# Patient Record
Sex: FEMALE | Race: ASIAN | NOT HISPANIC OR LATINO | Employment: FULL TIME | URBAN - METROPOLITAN AREA
[De-identification: names, ages, dates, MRNs, and addresses within clinical notes are randomized per-mention and may not be internally consistent; named-entity substitution may affect disease eponyms.]

---

## 2020-06-29 ENCOUNTER — OFFICE VISIT - HEALTHEAST (OUTPATIENT)
Dept: FAMILY MEDICINE | Facility: CLINIC | Age: 20
End: 2020-06-29

## 2020-06-29 DIAGNOSIS — M20.11 VALGUS DEFORMITY OF BOTH GREAT TOES: ICD-10-CM

## 2020-06-29 DIAGNOSIS — M20.12 VALGUS DEFORMITY OF BOTH GREAT TOES: ICD-10-CM

## 2020-06-29 DIAGNOSIS — L03.818 CELLULITIS OF OTHER SPECIFIED SITE: ICD-10-CM

## 2020-06-29 ASSESSMENT — MIFFLIN-ST. JEOR: SCORE: 1346.02

## 2020-07-01 LAB — BACTERIA SPEC CULT: ABNORMAL

## 2020-07-06 ENCOUNTER — OFFICE VISIT - HEALTHEAST (OUTPATIENT)
Dept: PODIATRY | Facility: CLINIC | Age: 20
End: 2020-07-06

## 2020-07-06 DIAGNOSIS — M20.10 HALLUX VALGUS, UNSPECIFIED LATERALITY: ICD-10-CM

## 2020-07-13 ENCOUNTER — COMMUNICATION - HEALTHEAST (OUTPATIENT)
Dept: PODIATRY | Facility: CLINIC | Age: 20
End: 2020-07-13

## 2020-07-14 ENCOUNTER — OFFICE VISIT - HEALTHEAST (OUTPATIENT)
Dept: PODIATRY | Facility: CLINIC | Age: 20
End: 2020-07-14

## 2020-07-14 ENCOUNTER — SURGERY - HEALTHEAST (OUTPATIENT)
Dept: PODIATRY | Facility: CLINIC | Age: 20
End: 2020-07-14

## 2020-07-14 DIAGNOSIS — M20.10 HALLUX ABDUCTOVALGUS WITH BUNIONS, UNSPECIFIED LATERALITY: ICD-10-CM

## 2020-07-14 DIAGNOSIS — M20.10 HALLUX VALGUS, UNSPECIFIED LATERALITY: ICD-10-CM

## 2020-07-14 DIAGNOSIS — M21.619 HALLUX ABDUCTOVALGUS WITH BUNIONS, UNSPECIFIED LATERALITY: ICD-10-CM

## 2020-07-15 ENCOUNTER — COMMUNICATION - HEALTHEAST (OUTPATIENT)
Dept: PODIATRY | Facility: CLINIC | Age: 20
End: 2020-07-15

## 2020-07-16 ENCOUNTER — AMBULATORY - HEALTHEAST (OUTPATIENT)
Dept: SURGERY | Facility: AMBULATORY SURGERY CENTER | Age: 20
End: 2020-07-16

## 2020-07-16 ENCOUNTER — AMBULATORY - HEALTHEAST (OUTPATIENT)
Dept: PODIATRY | Facility: CLINIC | Age: 20
End: 2020-07-16

## 2020-07-16 DIAGNOSIS — Z11.59 ENCOUNTER FOR SCREENING FOR OTHER VIRAL DISEASES: ICD-10-CM

## 2020-07-30 ENCOUNTER — AMBULATORY - HEALTHEAST (OUTPATIENT)
Dept: LAB | Facility: CLINIC | Age: 20
End: 2020-07-30

## 2020-07-30 ENCOUNTER — COMMUNICATION - HEALTHEAST (OUTPATIENT)
Dept: FAMILY MEDICINE | Facility: CLINIC | Age: 20
End: 2020-07-30

## 2020-07-30 ENCOUNTER — OFFICE VISIT - HEALTHEAST (OUTPATIENT)
Dept: FAMILY MEDICINE | Facility: CLINIC | Age: 20
End: 2020-07-30

## 2020-07-30 DIAGNOSIS — M21.611 BILATERAL BUNIONS: ICD-10-CM

## 2020-07-30 DIAGNOSIS — M21.612 BILATERAL BUNIONS: ICD-10-CM

## 2020-07-30 DIAGNOSIS — Z01.818 PRE-OP EXAM: ICD-10-CM

## 2020-07-30 LAB
ANION GAP SERPL CALCULATED.3IONS-SCNC: 9 MMOL/L (ref 5–18)
BUN SERPL-MCNC: 11 MG/DL (ref 8–22)
CALCIUM SERPL-MCNC: 9 MG/DL (ref 8.5–10.5)
CHLORIDE BLD-SCNC: 107 MMOL/L (ref 98–107)
CO2 SERPL-SCNC: 23 MMOL/L (ref 22–31)
CREAT SERPL-MCNC: 0.66 MG/DL (ref 0.6–1.1)
ERYTHROCYTE [DISTWIDTH] IN BLOOD BY AUTOMATED COUNT: 13.1 % (ref 11–14.5)
GFR SERPL CREATININE-BSD FRML MDRD: >60 ML/MIN/1.73M2
GLUCOSE BLD-MCNC: 82 MG/DL (ref 70–125)
HCG UR QL: NEGATIVE
HCT VFR BLD AUTO: 39.1 % (ref 35–47)
HGB BLD-MCNC: 13.1 G/DL (ref 12–16)
MCH RBC QN AUTO: 26.1 PG (ref 27–34)
MCHC RBC AUTO-ENTMCNC: 33.5 G/DL (ref 32–36)
MCV RBC AUTO: 78 FL (ref 80–100)
PLATELET # BLD AUTO: 249 THOU/UL (ref 140–440)
PMV BLD AUTO: 7.9 FL (ref 7–10)
POTASSIUM BLD-SCNC: 4 MMOL/L (ref 3.5–5)
RBC # BLD AUTO: 5.03 MILL/UL (ref 3.8–5.4)
SODIUM SERPL-SCNC: 139 MMOL/L (ref 136–145)
WBC: 4.8 THOU/UL (ref 4–11)

## 2020-07-30 ASSESSMENT — MIFFLIN-ST. JEOR: SCORE: 1333.32

## 2020-07-31 ENCOUNTER — RECORDS - HEALTHEAST (OUTPATIENT)
Dept: ADMINISTRATIVE | Facility: OTHER | Age: 20
End: 2020-07-31

## 2020-07-31 ENCOUNTER — AMBULATORY - HEALTHEAST (OUTPATIENT)
Dept: FAMILY MEDICINE | Facility: CLINIC | Age: 20
End: 2020-07-31

## 2020-07-31 ENCOUNTER — ANESTHESIA - HEALTHEAST (OUTPATIENT)
Dept: SURGERY | Facility: AMBULATORY SURGERY CENTER | Age: 20
End: 2020-07-31

## 2020-07-31 DIAGNOSIS — Z11.59 ENCOUNTER FOR SCREENING FOR OTHER VIRAL DISEASES: ICD-10-CM

## 2020-07-31 ASSESSMENT — MIFFLIN-ST. JEOR: SCORE: 1332.41

## 2020-08-03 ENCOUNTER — SURGERY - HEALTHEAST (OUTPATIENT)
Dept: SURGERY | Facility: AMBULATORY SURGERY CENTER | Age: 20
End: 2020-08-03

## 2020-08-04 ENCOUNTER — COMMUNICATION - HEALTHEAST (OUTPATIENT)
Dept: PODIATRY | Facility: CLINIC | Age: 20
End: 2020-08-04

## 2020-10-22 ENCOUNTER — RECORDS - HEALTHEAST (OUTPATIENT)
Dept: ADMINISTRATIVE | Facility: OTHER | Age: 20
End: 2020-10-22

## 2020-11-18 ENCOUNTER — RECORDS - HEALTHEAST (OUTPATIENT)
Dept: ADMINISTRATIVE | Facility: OTHER | Age: 20
End: 2020-11-18

## 2020-12-16 ENCOUNTER — RECORDS - HEALTHEAST (OUTPATIENT)
Dept: ADMINISTRATIVE | Facility: OTHER | Age: 20
End: 2020-12-16

## 2021-05-26 VITALS
HEART RATE: 82 BPM | WEIGHT: 136 LBS | BODY MASS INDEX: 24.1 KG/M2 | DIASTOLIC BLOOD PRESSURE: 72 MMHG | HEIGHT: 63 IN | SYSTOLIC BLOOD PRESSURE: 108 MMHG | OXYGEN SATURATION: 98 % | RESPIRATION RATE: 16 BRPM | TEMPERATURE: 97.3 F

## 2021-05-26 VITALS
BODY MASS INDEX: 23.6 KG/M2 | TEMPERATURE: 97.6 F | DIASTOLIC BLOOD PRESSURE: 70 MMHG | HEART RATE: 74 BPM | WEIGHT: 133.2 LBS | SYSTOLIC BLOOD PRESSURE: 108 MMHG | RESPIRATION RATE: 16 BRPM | HEIGHT: 63 IN | OXYGEN SATURATION: 98 %

## 2021-06-04 VITALS
HEIGHT: 63 IN | BODY MASS INDEX: 23.56 KG/M2 | BODY MASS INDEX: 23.56 KG/M2 | HEIGHT: 63 IN | WEIGHT: 133 LBS | BODY MASS INDEX: 23.6 KG/M2 | BODY MASS INDEX: 23.6 KG/M2 | WEIGHT: 133 LBS

## 2021-06-09 NOTE — PATIENT INSTRUCTIONS - HE
I recommended the patient return to the clinic in 1 week for a face-to-face visit at which time x-rays of both feet will be taken. Please come a little early and an x ray can be done at that time.     [facility] Virtual Check-In Number    [phone]    When you arrive for your IN OFFICE visit. Please call this number from the parking lot.      The staff will then virtually check you in and meet you at the door to escort you to a room.    If you arrive early and a room is not yet ready for you staff may have you wait can call you back when they are ready.     Please remember to wear a mask into your appointment     No Visitors Allowed    If you are having any symptoms- cough, fever, rash, loss of taste and smell or shortness of breath we ask that you please call and reschedule your appointment.

## 2021-06-09 NOTE — PROGRESS NOTES
Reviewed bilateral bunionectomy  surgery (ok bilateral same day)and need for covid testing and preop h and P. Plan to be in bilateral post op shoe following surgery(Minimal weight bearing). Minimum off of work for 2 weeks would like 4 weeks.

## 2021-06-09 NOTE — PROGRESS NOTES
"Assessment / Impression     1. Cellulitis of other specified site  clindamycin (CLEOCIN) 300 MG capsule    Culture, Wound   2. Valgus deformity of both great toes  Ambulatory referral to Podiatry          Plan:     Discussed with patient that with the erythema, there may be a skin infection, however the papule is not commonly seen with skin infection, may be something separate such as granuloma.  For now, wound culture was collected, will treat with 10-day course of clindamycin.  Discussed possible side effects of medication.  Consider adjustment of medications depending on wound culture results.  If symptoms persist, return to clinic for reevaluation in 10 days.    Discussed findings on exam, will give patient referral to podiatry to discuss further management.    Return in about 10 days (around 7/9/2020), or if symptoms worsen or fail to improve.    Subjective:      HPI: Ruel Salter is a 20 y.o. female, new to [facility], who presents for \"piercing infection and bunions.\"  Patient had a belly piercing in February, 2019, and a week and a half ago, noted some redness surrounding the piercing.  She tried changing the hardware, and the redness did not get worse.  She has not had any fevers.  She tried using some disinfecting spray that she got for the piercing.  No drainage.  No other over-the-counter medications.    In January of this year, when she is running, she has noted pain at base of great toe bilaterally, and thinks it is due to bunion.   no pain when she is walking, though anytime she jogs she does note pain in the area.  It does get better after resting.      Medical History:     There is no problem list on file for this patient.      History reviewed. No pertinent past medical history.    Past Surgical History:   Procedure Laterality Date     NO PAST SURGERIES         Current Medications:     Current Outpatient Medications   Medication Sig     clindamycin (CLEOCIN) 300 MG capsule Take 1 capsule " "(300 mg total) by mouth 3 (three) times a day for 10 days.       Family History:     Family History   Problem Relation Age of Onset     No Medical Problems Mother      No Medical Problems Father        Review of Systems  All other systems reviewed and are negative.         Social History:     Social History     Tobacco Use   Smoking Status Never Smoker   Smokeless Tobacco Never Used     Social History     Social History Narrative    Lives with parents and 4 siblings.  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, sales, economics minor         Objective:     /72 (Patient Site: Right Arm, Patient Position: Sitting, Cuff Size: Adult Regular)   Pulse 82   Temp 97.3  F (36.3  C) (Tympanic)   Resp 16   Ht 5' 3\" (1.6 m)   Wt 136 lb (61.7 kg)   LMP 06/16/2020 (Exact Date)   SpO2 98%   Breastfeeding No   BMI 24.09 kg/m    Physical Examination: General appearance - alert, well appearing, and in no distress  Eyes: extraocular eye movements intact  Mouth: mucous membranes moist, pharynx normal without lesions  Neck: supple, no significant adenopathy or thyromegaly  Lungs: clear to auscultation, no wheezes, rales or rhonchi, symmetric air entry  Heart: normal rate, regular rhythm, normal S1, S2, no murmurs.  Abdomen: soft, nontender, nondistended, no masses or organomegaly  Skin: there is belly piercing noted.  Just superior to umbilicus, there is skin erythema and an erythematous papule, approximately 2mm in size noted.  Neurological: alert, oriented, normal speech, no focal findings or movement disorder noted.    Extremities: No edema, no clubbing or cyanosis.  There is hallux valgus noted bilaterally.  Psychiatric: Normal affect. Does not appear anxious or depressed.    No results found for this or any previous visit (from the past 168 hour(s)).      Bibi Forrest MD  6/29/2020  10:15 AM        "

## 2021-06-09 NOTE — PROGRESS NOTES
bunion issues bilateral feet.     FOOT AND ANKLE SURGERY/PODIATRY CONSULT NOTE         ASSESSMENT:   Hallux abductovalgus both feet      TREATMENT:  I informed the patient she may require surgical correction of her painful deformities.  I recommended the patient return to the clinic in 1 week for a face-to-face visit at which time x-rays of both feet will be taken.        HPI: I was asked to see Ruel Salter today for evaluation and treatment of painful bunions both feet.  This visit took place via video visit utilizing Delilah well.  The video visit start time [id number].  Video end time was 1315.  The patient stated that she has had bunions most of her life.  Starting in January of this year the bunions have increased in size and have become more painful.  She stated that the pain is aggravated by her shoe gear and ambulation.  The only relief she has is after removing her shoe gear.  She has tried shoe modification to accommodate her deformities but she continues to have an aching type pain which is moderate to severe.  Her bunions are aggravated with prolonged weightbearing and ambulation.  She denies any particular trauma to her feet.  She does notice some redness surrounding the painful area of her feet.  She denies any other previous treatment.  She describes it as an aching type pain.  The patient was seen in consultation at the request of Bibi Forrest MD for evaluation and treatment of painful bunions both feet         No past medical history on file.    Past Surgical History:   Procedure Laterality Date     NO PAST SURGERIES         No Known Allergies      Current Outpatient Medications:      clindamycin (CLEOCIN) 300 MG capsule, Take 1 capsule (300 mg total) by mouth 3 (three) times a day for 10 days., Disp: 30 capsule, Rfl: 0    Family History   Problem Relation Age of Onset     No Medical Problems Mother      No Medical Problems Father        Social History     Socioeconomic History     Marital status:  Single     Spouse name: Not on file     Number of children: Not on file     Years of education: Not on file     Highest education level: Not on file   Occupational History     Not on file   Social Needs     Financial resource strain: Not on file     Food insecurity     Worry: Not on file     Inability: Not on file     Transportation needs     Medical: Not on file     Non-medical: Not on file   Tobacco Use     Smoking status: Never Smoker     Smokeless tobacco: Never Used   Substance and Sexual Activity     Alcohol use: Yes     Frequency: 2-4 times a month     Drinks per session: 1 or 2     Drug use: Never     Sexual activity: Not on file   Lifestyle     Physical activity     Days per week: Not on file     Minutes per session: Not on file     Stress: Not on file   Relationships     Social connections     Talks on phone: Not on file     Gets together: Not on file     Attends Christianity service: Not on file     Active member of club or organization: Not on file     Attends meetings of clubs or organizations: Not on file     Relationship status: Not on file     Intimate partner violence     Fear of current or ex partner: Not on file     Emotionally abused: Not on file     Physically abused: Not on file     Forced sexual activity: Not on file   Other Topics Concern     Not on file   Social History Narrative    Lives with parents and 4 siblings.  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

## 2021-06-09 NOTE — PATIENT INSTRUCTIONS - HE
Surgery Information    Surgery Date TBD    Surgery Time TBD    ( Arrive at the hospital one and a half hours prior to your surgery and 1 hour prior at the surgery center. Check in at the . The only exception is if you are scheduled for surgery at 7am, you should arrive at 5:30am) The nurse will call you a couple of day before surgery go with the time the nurse tells you.  All surgery times are estimated please go with what the nurse tells you when she calls.  Emergency's do happen and surgery times do get changed the nurse will let you know.  We give you the best estimate of time that we can at the time.      **ALL [facility] PAPERWORK IS TO BE FAXED [phone], IT WILL BE PROCESSED AFTER SURGERY IS COMPLETE.    IF YOU NEED TO RESCHEDULE OR CANCEL YOUR SURGERY FOR ANY REASON PLEASE CALL THE CLINIC AS SOON AS POSSIBLE [phone].    Admission Type: Outpatient    Surgeon: Dr. Alejandra    Surgery Procedure: bilateral bunionectomy(OK to do both on sameday)     Surgery Location: [facility],[address] [place] [address], FAX [phone]    Additional Information: If you use a CPAP machine for sleep apnea please bring it with you for surgery. We will want to monitor your breathing using your normal equipment.     HOSPITAL AND SURGERY CENTER INFORMATION    We need to know a lot of information about you before surgery.     1-5 Days Before:     A nurse will call you for a pre-screening interview. The phone call with the nurse will be much faster and easier if you  Have organized your information prior to the call.   This is when you will be told when to show up and what to bring.    Please have the following information available:    Preoperative Exam completed and faxed to our office  has to be within 30 days will not except 31 days.    Primary care provider's and any specialty providers' contact information available. .    If requested by your primary care provider, have any heart and lung exams  at least 3 days before surgery.    Have a complete and accurate list of medications available.     Have a list of your allergies/sensitivities and reactions    Know your health history, surgical history, and medical problems    Know any anesthesia issues with you or within your family.     BE SURE TO NOTIFY US IF YOU ARE TAKING ANY BLOOD THINNING AGENTS: Coumadin, Plavix, Aspirin, Xarelto, Eliquis    Someone plan to bring you and stay with you after the surgery for 24 hours    Day Before Surgery    1. STOP Smoking and Drinking: It is important to stop smoking and drinking at least 24 hours before surgery.  Smoking and drinking may cause complications in your recovery from anesthesia and may lengthen the healing process.    Please bring with you the day of surgery      List of medication    Eyeglass case or contact case with solution. You cannot wear contacts during surgery    Copy of Health Care Directives, Living Will or Power of     Insurance Cards    Photo ID    3. NOTHING BY MOUTH 8 HOURS BEFORE. This includes gum, hard candy, water and mints. The only exception is if you have been instructed by your doctor to take your medications with sips of water. You may rinse your mouth or brush your teeth, but do not swallow water.     4. Remove Nail Polish on fingers as well as toes  Day of Surgery    1. Medications- Take as indicated with sips of water     2. Wear comfortable loose fitting clothes. Wear your glasses-Not contacts. Do not wear jewelry and remove body piercing's. Surgery may be cancelled if they are not removed.     3. If same day surgery-Have a someone come with you to surgery that can help you understand the surgeon's instructions, drive you home and stay with you overnight the first night.     4. A nurse will call you at home within 72 hours following surgery to see how you are doing. Our nurses and doctors will discuss recovery with you.     Potential  Complications  Bunionectomy  1. Infection  2. Pain  3. Swelling  4. Incomplete bone healing  5. Reoccurrence of deformity  6. Floating toe  7. Stiffness of toe  8. Painful scar Blood clots.  Instructions for Patients Scheduled for Vascular or Podiatry Procedures During the COVID 19 Pandemic    Your Provider has determined that your condition warrants going ahead with your procedure at this time.  You will need to be tested for COVID19 within 72 hours of your procedure. We highly encourage patients to get tested for COVID-19 at one of our designated [facility] testing sites. We process the tests in our lab, which allows us to get the results quickly. If you choose to get tested at a non-[facility] location, you will need to contact your primary care provider to make those arrangements and ensure the results are available to your surgeon before you are arriving for your procedure. If we do not receive the results in time, your procedure may be postponed or canceled.  Please make sure your test is collected 3-days prior to your procedure date.  The results will need to get faxed to [phone].  After testing, you will need to remain in self-quarantine until your procedure.  If you are notified of a positive COVID-19 result; you will need to call your provider for further recommendations.    Please call [phone] and press option 2 to speak to a nurse.  If the test is positive; DO NOT PRESENT FOR YOUR PROCEDURE until you have been given further instructions.  You will not be called with negative results so arrive as instructed unless otherwise notified.  Don't:    Don't invite visitors or friends into your home.    Don't leave your home unless absolutely necessary.    Don't share utensils and other household items with others in the home.  Do:    Wash your hands regularly with soap and water and use hand  with at least 60% alcohol if you don't have easy access to soap and water.     Disinfect  surface areas daily, including doorknobs, electronics - especially phones, laptops and other devices.     Wash utensils and other items thoroughly.     Separate yourself from others in the household as best you can, including pets.    Keep your hands away from your face.     Practice all other prevention tips the CDC recommends, including covering your coughs and sneezes with a tissue or your sleeve and immediately throwing the tissue into the trash and washing your hands.    Call your hospital if you develop the following signs before your surgery:    Fever.    Cough.    Shortness of breath.    Sore throat.    Runny or stuffy nose.    Muscle or body aches.    Headaches.    Fatigue.    Vomiting and diarrhea.    These steps will help keep you & your family, other patients, and hospital staff safe from COVID19.  Schedule 3 days prior call [phone].

## 2021-06-09 NOTE — TELEPHONE ENCOUNTER
Attempted to contact patient to schedule surgery with Dr. Alejandra. No answer and voicemail full.

## 2021-06-09 NOTE — PROGRESS NOTES
Surgery/Procedure: Bunionectomy with first metatarsal osteotomy with internal screw fixation both feet    Special Equipment: TBD    [place] [facility] [place]: [place]    Time: 10:30am    Surgeon: Dr. Alejandra    Assist: No    Length of Surgery: 60 minutes    OR Confirmed/ :  Matthew Sherwood on 07/16/20    Orders In:  Yes    Provider Team Notified:  Yes    Entered on [employer] Calendar:  Yes    Post Op: 08/04 and 08/11/20

## 2021-06-09 NOTE — PROGRESS NOTES
FOOT AND ANKLE SURGERY/PODIATRY Progress Note        ASSESSMENT:   Hallux abductovalgus both feet    HPI: Ruel Salter was seen again today for follow-up evaluation of painful bunions both feet.  The patient stated in January of this year the bunions have progressed and have been causing severe pain.  She has difficulty wearing shoes without pain.  She describes as aching type pain which is aggravated with weightbearing and ambulation.  She denies any trauma to her feet.  She has not had any associated redness or swelling with her painful bunions.  He denies any other previous treatment.    History reviewed. No pertinent past medical history.    Past Surgical History:   Procedure Laterality Date     NO PAST SURGERIES         No Known Allergies    No current outpatient medications on file.    Family History   Problem Relation Age of Onset     No Medical Problems Mother      No Medical Problems Father        Social History     Socioeconomic History     Marital status: Single     Spouse name: Not on file     Number of children: Not on file     Years of education: Not on file     Highest education level: Not on file   Occupational History     Not on file   Social Needs     Financial resource strain: Not on file     Food insecurity     Worry: Not on file     Inability: Not on file     Transportation needs     Medical: Not on file     Non-medical: Not on file   Tobacco Use     Smoking status: Never Smoker     Smokeless tobacco: Never Used   Substance and Sexual Activity     Alcohol use: Yes     Frequency: 2-4 times a month     Drinks per session: 1 or 2     Drug use: Never     Sexual activity: Not on file   Lifestyle     Physical activity     Days per week: Not on file     Minutes per session: Not on file     Stress: Not on file   Relationships     Social connections     Talks on phone: Not on file     Gets together: Not on file     Attends Muslim service: Not on file     Active member of club or organization: Not on  file     Attends meetings of clubs or organizations: Not on file     Relationship status: Not on file     Intimate partner violence     Fear of current or ex partner: Not on file     Emotionally abused: Not on file     Physically abused: Not on file     Forced sexual activity: Not on file   Other Topics Concern     Not on file   Social History Narrative    Lives with parents and 4 siblings.  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    OBJECTIVE:  General appearance: Patient is alert and fully cooperative with history & exam.  No sign of distress is noted during the visit.  Vascular: Dorsalis pedis and posterior tibial pulses are palpable. There is pedal hair growth bilaterally.  CFT < 3 sec from anterior tibial surface to distal digits bilaterally. There is no appreciable edema noted.  Dermatologic: Turgor and texture are within normal limits. No coloration or temperature changes. No primary or secondary lesions noted.  Neurologic: All epicritic and proprioceptive sensations are grossly intact bilaterally.  Musculoskeletal: All active and passive ankle, subtalar, midtarsal joint range of motion are grossly intact without pain or crepitus, with the exception of the first metatarsal phalangeal joint both feet.  There is a large firm palpable subcutaneous mass on the medial aspect of the head of the first metatarsal bilaterally.  There is pain on range of motion of the first MPJ bilaterally.  Manual muscle strength is within normal limits bilaterally. All dorsiflexors, plantarflexors, invertors, evertors are intact bilaterally. Tenderness present to the first metatarsal phalangeal joint both feet on palpation.  Calf is soft/non-tender without warmth/induration    Imaging:         No  results found.         TREATMENT:  I have recommended surgical correction of the painful deformities.  I recommended a bunionectomy with a first metatarsal osteotomy with internal screw fixation both feet.  The patient was told the procedures are performed on an outpatient basis under local anesthesia with IV sedation.  She was told the procedure would take approximately 25 minutes per foot.  She would then be discharged weightbearing in a postoperative shoe for 1 month.  The patient was asked to go n.p.o. at midnight prior to the procedure and she was asked to see her primary care physician for preoperative consultation.  All pertinent questions were answered.  The patient felt well-informed and decided to move forward with the surgical treatment  Plan.        Hayden Alejandra; RADHA  [facility]Podiatry                 Bilateral bunions. Pain when wearing closed shoes and walking and running.

## 2021-06-10 NOTE — TELEPHONE ENCOUNTER
Spoke with Ruel and explained blood typing is not a routine blood test that is done on everyone. She plans to donate so they will be able to assist her.

## 2021-06-10 NOTE — TELEPHONE ENCOUNTER
Patient had to cancel 07/31 surgery with [facility] last minute due to a family emergency. I followed up with patient today. She has an urgent family situation to work through and she will call back when ready to reschedule. Her pre-op was 07/30/20 with her PCP.

## 2021-06-10 NOTE — PROGRESS NOTES
Preoperative Exam      Scheduled Procedure: bunionectomy bilateral  Surgery Date:  08/03/20  Surgery Location: [facility], fax [phone]    Surgeon:  Dr. Hayden Krishnan    Assessment/Plan:     There are no diagnoses linked to this encounter.     Surgical Procedure Risk: Low (reported cardiac risk generally < 1%)  Have you had prior anesthesia?: No  Have you or any family members had a previous anesthesia reaction:  No  Do you or any family members have a history of a clotting or bleeding disorder?: No  Cardiac Risk Assessment: no increased risk for major cardiac complications    APPROVAL GIVEN to proceed with proposed procedure, without further diagnostic evaluation    Please Note:  no apnea    Functional Status: Independent  Patient plans to recover at home with family.     Subjective:      Ruel Salter is a 20 y.o. female who presents for a preoperative consultation.  She has no history of CAD, hypertension, diabetes, asthma, COPD, kidney disease. She is a non-smoker.      All other systems reviewed and are negative, other than those listed in the HPI.    Pertinent History  Do you have difficulty breathing or chest pain after walking up a flight of stairs: No  History of obstructive sleep apnea: No  Steroid use in the last 6 months: No  Frequent Aspirin/NSAID use: No  Prior Blood Transfusion: No  Prior Blood Transfusion Reaction: No  If for some reason prior to, during or after the procedure, if it is medically indicated, would you be willing to have a blood transfusion?:  There is no transfusion refusal.    No current outpatient medications on file.     No current facility-administered medications for this visit.         No Known Allergies    Patient Active Problem List   Diagnosis     Hallux abductovalgus with bunions, unspecified laterality       No past medical history on file.    Past Surgical History:   Procedure Laterality Date     NO PAST SURGERIES         Social History     Socioeconomic  "History     Marital status: Single     Spouse name: Not on file     Number of children: Not on file     Years of education: Not on file     Highest education level: Not on file   Occupational History     Not on file   Social Needs     Financial resource strain: Not on file     Food insecurity     Worry: Not on file     Inability: Not on file     Transportation needs     Medical: Not on file     Non-medical: Not on file   Tobacco Use     Smoking status: Never Smoker     Smokeless tobacco: Never Used   Substance and Sexual Activity     Alcohol use: Yes     Frequency: 2-4 times a month     Drinks per session: 1 or 2     Drug use: Never     Sexual activity: Not on file   Lifestyle     Physical activity     Days per week: Not on file     Minutes per session: Not on file     Stress: Not on file   Relationships     Social connections     Talks on phone: Not on file     Gets together: Not on file     Attends Yazidism service: Not on file     Active member of club or organization: Not on file     Attends meetings of clubs or organizations: Not on file     Relationship status: Not on file     Intimate partner violence     Fear of current or ex partner: Not on file     Emotionally abused: Not on file     Physically abused: Not on file     Forced sexual activity: Not on file   Other Topics Concern     Not on file   Social History Narrative    Lives with parents and 4 siblings.  Student at [employer]--[employer], [employer], economics minor       Patient Care Team:  Bibi Forrest MD as PCP - General (Family Medicine)  Bibi Forrest MD as Assigned PCP          Objective:     Vitals:    07/30/20 1012   BP: 108/70   Pulse: 74   Resp: 16   Temp: 97.6  F (36.4  C)   TempSrc: Tympanic   SpO2: 98%   Weight: 133 lb 3.2 oz (60.4 kg)   Height: 5' 3\" (1.6 m)   LMP: 07/28/2020         Physical Exam:  Alert, cooperative, well-hydrated.  Appears well.  Eyes: Pupils equal, round, reactive to light.  HEENT: Sclera white, nares " patent, MMM   Lungs: Clear to auscultation. No retractions, no increased work of respiration, equal chest rise.   Heart: Regular rate and rhythm, no murmurs, clicks,    Gallops.  Abdomen: Soft, bowel sounds in 4 quadrants with no tenderness to palpation, no organomegaly or masses, no aortic or renal bruits.  Extremities: no tenderness to palpation of gastrocnemius, bilaterally.  Skin: no increased warmth, edema, or erythema of lower legs bilaterally.  Back:  No cervical, thoracic or lumbar tenderness to spinous processes or musculature.  Neuro: pupils equal and reactive to light bilaterally, CN II - XII  intact. No focal motor/sensory deficits.Rhomberg negative. M/S 5/5 all extremities. DTR 2/4 all extremities    There are no Patient Instructions on file for this visit.        Immunization History   Administered Date(s) Administered     Dtap 1999, 1999, 1999, 06/28/2000, 06/30/2003     HIB (HBOC) 05/08/2000     HPV 9 Valent 01/25/2018, 03/27/2018, 07/27/2018     Hep B / HiB 1999, 1999     Hep B, Peds or Adolescent 1999     Hepatitis A, Ped/Adol 2 Dose IM (18yr & under) 06/16/2011, 08/25/2016     HiB, historic,unspecified 1999, 05/08/2000     IPV 1999, 1999, 1999, 06/30/2003     MMR 05/08/2000, 06/30/2003     Meningococcal MCV4O 08/25/2016     Meningococcal MCV4P 06/16/2011, 08/25/2016     POLIO, Unspecified 1999, 1999, 1999, 06/30/2003     Pneumo Conj 7-V(before 2010) 07/11/2001     Tdap 06/16/2011     Varicella 10/05/2004, 06/16/2011           Electronically signed by Ntaalia Zaldivar PA-C 07/30/20 10:10 AM

## 2021-06-16 PROBLEM — M21.619 HALLUX ABDUCTOVALGUS WITH BUNIONS, UNSPECIFIED LATERALITY: Status: ACTIVE | Noted: 2020-07-16

## 2021-06-16 PROBLEM — M20.10 HALLUX ABDUCTOVALGUS WITH BUNIONS, UNSPECIFIED LATERALITY: Status: ACTIVE | Noted: 2020-07-16

## 2021-06-26 ENCOUNTER — HEALTH MAINTENANCE LETTER (OUTPATIENT)
Age: 21
End: 2021-06-26

## 2021-06-29 ENCOUNTER — COMMUNICATION - HEALTHEAST (OUTPATIENT)
Dept: FAMILY MEDICINE | Facility: CLINIC | Age: 21
End: 2021-06-29

## 2021-07-03 NOTE — ADDENDUM NOTE
Addendum Note by Bharti Forrest MD at 6/29/2020  9:00 AM     Author: Bharti Forrest MD Service: -- Author Type: Physician    Filed: 6/29/2020 11:57 AM Encounter Date: 6/29/2020 Status: Signed    : Bharti Forrest MD (Physician)    Addended by: BHARTI FORREST on: 6/29/2020 11:57 AM        Modules accepted: Level of Service

## 2021-07-06 ENCOUNTER — RECORDS - HEALTHEAST (OUTPATIENT)
Dept: ADMINISTRATIVE | Facility: OTHER | Age: 21
End: 2021-07-06

## 2021-07-07 NOTE — LETTER
Letter by Bibi Forrest MD at      Author: Bibi Forrest MD Service: -- Author Type: --    Filed:  Encounter Date: 2021 Status: (Other)         Ruel Salter  [address] [place] [address]      2021      Dear Ruel Salter,   : 2000      This letter is in regards to the appointment that you had scheduled on 2021 at the [facility] with Dr. Forrest.     The [facility] strives to see all patients in a timely manner and we need your help to achieve this.  The above-mentioned appointment was missed and we do not have record of a cancellation by you.  Whenever possible, we request appointment cancellations at least 72 hours in advance.  This time allows us to offer the appointment to another patient in need.      If you feel you have received this letter in error, or if you need to reschedule this appointment, please call our office so that we may update our records.      Sincerely,    [facility]

## 2021-09-21 ENCOUNTER — OFFICE VISIT (OUTPATIENT)
Dept: FAMILY MEDICINE | Facility: CLINIC | Age: 21
End: 2021-09-21
Payer: COMMERCIAL

## 2021-09-21 VITALS
TEMPERATURE: 98.1 F | SYSTOLIC BLOOD PRESSURE: 113 MMHG | RESPIRATION RATE: 16 BRPM | DIASTOLIC BLOOD PRESSURE: 72 MMHG | HEART RATE: 84 BPM | OXYGEN SATURATION: 99 %

## 2021-09-21 DIAGNOSIS — N30.01 ACUTE CYSTITIS WITH HEMATURIA: Primary | ICD-10-CM

## 2021-09-21 LAB
ALBUMIN UR-MCNC: 100 MG/DL
APPEARANCE UR: ABNORMAL
BACTERIA #/AREA URNS HPF: ABNORMAL /HPF
BILIRUB UR QL STRIP: NEGATIVE
COLOR UR AUTO: YELLOW
GLUCOSE UR STRIP-MCNC: NEGATIVE MG/DL
HGB UR QL STRIP: ABNORMAL
KETONES UR STRIP-MCNC: NEGATIVE MG/DL
LEUKOCYTE ESTERASE UR QL STRIP: ABNORMAL
NITRATE UR QL: NEGATIVE
PH UR STRIP: 7.5 [PH] (ref 5–8)
RBC #/AREA URNS AUTO: ABNORMAL /HPF
SP GR UR STRIP: 1.02 (ref 1–1.03)
SQUAMOUS #/AREA URNS AUTO: ABNORMAL /LPF
UROBILINOGEN UR STRIP-ACNC: 0.2 E.U./DL
WBC #/AREA URNS AUTO: ABNORMAL /HPF

## 2021-09-21 PROCEDURE — 87086 URINE CULTURE/COLONY COUNT: CPT | Performed by: PHYSICIAN ASSISTANT

## 2021-09-21 PROCEDURE — 81001 URINALYSIS AUTO W/SCOPE: CPT | Performed by: PHYSICIAN ASSISTANT

## 2021-09-21 PROCEDURE — 99213 OFFICE O/P EST LOW 20 MIN: CPT | Performed by: PHYSICIAN ASSISTANT

## 2021-09-21 PROCEDURE — 87186 SC STD MICRODIL/AGAR DIL: CPT | Performed by: PHYSICIAN ASSISTANT

## 2021-09-21 RX ORDER — NITROFURANTOIN 25; 75 MG/1; MG/1
100 CAPSULE ORAL 2 TIMES DAILY
Qty: 10 CAPSULE | Refills: 0 | Status: SHIPPED | OUTPATIENT
Start: 2021-09-21 | End: 2021-09-26

## 2021-09-21 NOTE — PATIENT INSTRUCTIONS
Patient was educated on the natural course of condition.  Take medication as directed. Side effects discussed. Conservative measures include drinking fluids (water), wiping from front to back, avoiding holding urine when there is urge to urinate, urinating before and after sexual intercourse, avoiding sexual activity until infection is eliminated, and over-the-counter AZO. See your primary care provider if symptoms do not improve in 3 days. Seek emergency care if you develop severe abdominal/flank pain, or fever.

## 2021-09-21 NOTE — PROGRESS NOTES
URGENT CARE VISIT:    SUBJECTIVE:    Ruel Salter is a 21 year old female who  presents today for a possible UTI. Symptoms of dysuria, urgency, frequency and blood in urine have been going on for 1 day(s). Symptoms were sudden onset and mild.  Patient denies back pain, nausea, vomiting, fever and chills or vaginal discharge. This patient does not have a history of urinary tract infections.     PMH: History reviewed. No pertinent past medical history.  Allergies: Patient has no known allergies.   Medications:   Current Outpatient Medications   Medication Sig Dispense Refill     nitroFURantoin macrocrystal-monohydrate (MACROBID) 100 MG capsule Take 1 capsule (100 mg) by mouth 2 times daily for 5 days 10 capsule 0     Social History:   Social History     Tobacco Use     Smoking status: Never Smoker     Smokeless tobacco: Never Used   Substance Use Topics     Alcohol use: Yes       ROS:  Review of systems negative except as stated above.    OBJECTIVE:  /72   Pulse 84   Temp 98.1  F (36.7  C) (Oral)   Resp 16   LMP 08/23/2021   SpO2 99%   GENERAL APPEARANCE: healthy, alert and no distress  RESP: lungs clear to auscultation - no rales, rhonchi or wheezes  CV: regular rates and rhythm, normal S1 S2, no murmur noted  ABDOMEN:  Soft, mild suprapubic TTP, no HSM or masses and bowel sounds normal  BACK: No CVA tenderness  SKIN: no suspicious lesions or rashes    Labs:    Results for orders placed or performed in visit on 09/21/21   UA macro with reflex to Microscopic and Culture - Clinc Collect     Status: Abnormal    Specimen: Urine, Clean Catch   Result Value Ref Range    Color Urine Yellow Colorless, Straw, Light Yellow, Yellow    Appearance Urine Slightly Cloudy (A) Clear    Glucose Urine Negative Negative mg/dL    Bilirubin Urine Negative Negative    Ketones Urine Negative Negative mg/dL    Specific Gravity Urine 1.020 1.005 - 1.030    Blood Urine Large (A) Negative    pH Urine 7.5 5.0 - 8.0    Protein  Albumin Urine 100  (A) Negative mg/dL    Urobilinogen Urine 0.2 0.2, 1.0 E.U./dL    Nitrite Urine Negative Negative    Leukocyte Esterase Urine Moderate (A) Negative   Urine Microscopic Exam     Status: Abnormal   Result Value Ref Range    Bacteria Urine Moderate (A) None Seen /HPF    RBC Urine  (A) 0-2 /HPF /HPF    WBC Urine 25-50 (A) 0-5 /HPF /HPF    Squamous Epithelials Urine Few (A) None Seen /LPF       ASSESSMENT:     ICD-10-CM    1. Acute cystitis with hematuria  N30.01 UA macro with reflex to Microscopic and Culture - Clinc Collect     Urine Microscopic Exam     Urine Culture     nitroFURantoin macrocrystal-monohydrate (MACROBID) 100 MG capsule       PLAN:  Patient Instructions   Patient was educated on the natural course of condition.  Take medication as directed. Side effects discussed. Conservative measures include drinking fluids (water), wiping from front to back, avoiding holding urine when there is urge to urinate, urinating before and after sexual intercourse, avoiding sexual activity until infection is eliminated, and over-the-counter AZO. See your primary care provider if symptoms do not improve in 3 days. Seek emergency care if you develop severe abdominal/flank pain, or fever.     Patient verbalized understanding and is agreeable to plan. The patient was discharged ambulatory and in stable condition.    Sanaz Bourne PA-C on 9/21/2021 at 12:21 PM

## 2021-09-24 LAB — BACTERIA UR CULT: ABNORMAL

## 2021-10-11 ENCOUNTER — HEALTH MAINTENANCE LETTER (OUTPATIENT)
Age: 21
End: 2021-10-11

## 2021-10-13 ENCOUNTER — OFFICE VISIT (OUTPATIENT)
Dept: FAMILY MEDICINE | Facility: CLINIC | Age: 21
End: 2021-10-13
Payer: COMMERCIAL

## 2021-10-13 VITALS
SYSTOLIC BLOOD PRESSURE: 108 MMHG | DIASTOLIC BLOOD PRESSURE: 69 MMHG | HEIGHT: 63 IN | OXYGEN SATURATION: 98 % | HEART RATE: 61 BPM | TEMPERATURE: 97.7 F | WEIGHT: 126 LBS | BODY MASS INDEX: 22.32 KG/M2

## 2021-10-13 DIAGNOSIS — Z34.90 UNPLANNED PREGNANCY: ICD-10-CM

## 2021-10-13 DIAGNOSIS — Z76.89 ENCOUNTER TO ESTABLISH CARE WITH NEW DOCTOR: Primary | ICD-10-CM

## 2021-10-13 DIAGNOSIS — Z23 NEED FOR PROPHYLACTIC VACCINATION AND INOCULATION AGAINST INFLUENZA: ICD-10-CM

## 2021-10-13 DIAGNOSIS — Z30.09 ENCOUNTER FOR OTHER GENERAL COUNSELING OR ADVICE ON CONTRACEPTION: ICD-10-CM

## 2021-10-13 PROCEDURE — 90686 IIV4 VACC NO PRSV 0.5 ML IM: CPT | Performed by: STUDENT IN AN ORGANIZED HEALTH CARE EDUCATION/TRAINING PROGRAM

## 2021-10-13 PROCEDURE — 99203 OFFICE O/P NEW LOW 30 MIN: CPT | Mod: 25 | Performed by: STUDENT IN AN ORGANIZED HEALTH CARE EDUCATION/TRAINING PROGRAM

## 2021-10-13 PROCEDURE — 90471 IMMUNIZATION ADMIN: CPT | Performed by: STUDENT IN AN ORGANIZED HEALTH CARE EDUCATION/TRAINING PROGRAM

## 2021-10-13 ASSESSMENT — MIFFLIN-ST. JEOR: SCORE: 1297.4

## 2021-10-13 NOTE — PROGRESS NOTES
CHIEF COMPLAINT                                                      Chief Complaint   Patient presents with     Establish Care     21 years old, preganant, discuss termination options      Medication Reconciliation     tylenol PRN OTC     SUBJECTIVE:                                                    Ruel Salter is a 21 year old year old female who presents to clinic today for the following health issues:    New Patient/Transfer of Care  -Patient denies any known chronic ailments, feels good at baseline  -LMP end of August 2021  -New partner, not concerned about STI's, [facility]   -Unplanned pregnancy, would like to discuss termination options  -Otherwise has no acute health concerns or questions  -Does note she was on depo shot for a while previously, but stopped as she was not sexually active     Patient is an established patient of this clinic.    ----------------------------------------------------------------------------------------------------------------------  Patient Active Problem List   Diagnosis     Hallux abductovalgus with bunions, unspecified laterality     Past Surgical History:   Procedure Laterality Date     NO PAST SURGERIES         Social History     Tobacco Use     Smoking status: Never Smoker     Smokeless tobacco: Never Used   Substance Use Topics     Alcohol use: Yes     Family History   Problem Relation Age of Onset     No Known Problems Mother      No Known Problems Father      Cancer No family hx of      Diabetes No family hx of      Coronary Artery Disease No family hx of      Heart Disease No family hx of          Problem list and past medical, surgical, social, and family histories reviewed & adjusted, as indicated.    Currently not on any daily medication     Medication list reviewed and updated as indicated.    No Known Allergies  Allergies reviewed and updated as  "indicated.  ----------------------------------------------------------------------------------------------------------------------  ROS:  Constitutional, HEENT, cardiovascular, pulmonary, GI, musculoskeletal, neuro, skin, and psych systems are negative, except as otherwise noted.    OBJECTIVE:     /69   Pulse 61   Temp 97.7  F (36.5  C) (Oral)   Ht 1.595 m (5' 2.8\")   Wt 57.2 kg (126 lb)   LMP 08/23/2021 (Exact Date)   SpO2 98%   BMI 22.47 kg/m    Body mass index is 22.47 kg/m .  Exam:  Constitutional: healthy, alert and no distress  Head: Normocephalic. No masses, lesions, tenderness or abnormalities  Neck: Neck supple. No adenopathy. Thyroid symmetric, normal size  ENT: ENT exam normal, no neck nodes or sinus tenderness  Cardiovascular: negative, PMI normal. No lifts, heaves, or thrills. RRR. No murmurs, clicks gallops or rub  Respiratory: negative, Percussion normal. Good diaphragmatic excursion. Lungs clear  Gastrointestinal: Abdomen soft, non-tender. BS normal. No masses, organomegaly  Musculoskeletal: extremities normal- no gross deformities noted, gait normal and normal muscle tone  Skin: no suspicious lesions or rashes  Neurologic: Gait normal. Reflexes normal and symmetric. Sensation grossly WNL.  Psychiatric: mentation appears normal and affect normal/bright  Hematologic/Lymphatic/Immunologic: Normal cervical lymph nodes    Diagnostic Test Results:  none     ASSESSMENT/PLAN:     (Z76.89) Encounter to establish care with new doctor  (primary encounter diagnosis)  (Z34.90) Unplanned pregnancy  (Z30.09) Encounter for other general counseling or advice on contraception  -Patient here to establish care, no chronic health conditions  -Due for pap as it has never been done, not interested at this time but understands she should get one scheduled at her next convenience   -Today discussed unplanned pregnancy  -Provided counseling and information about termination options (medication vs surgical " procedure)  -Patient is likely ~7-8 weeks along and should qualify for medication induced   -Discussed process and need for an ultrasound for dating, we will schedule that for here but also let patient know that she should investigate where insurance would provide better coverage   -Discussed contraception options at this time and how effective and duration of efficacy.    -Patient not ready to make a decision on contraception but knows she is welcome to come back for this when ready  -Otherwise denied any other concerns  -Getting flu shot    There are no discontinued medications.    Options for treatment and follow-up care were reviewed with the patient and/or guardian. Ruel Salter and/or guardian engaged in the decision making process and verbalized understanding of the options discussed and agreed with the final plan    Precepted with Dr. Rosenstein.    Dirk Elise MD on 10/13/2021 at 10:15 AM

## 2021-10-13 NOTE — PROGRESS NOTES
Preceptor Attestation:   Patient seen, evaluated and discussed with the resident Dr. Elise. I have verified the content of the note, which accurately reflects my assessment of the patient and the plan of care.    Does not wish to continue pregnancy. Questions answered and referrals given.    Supervising Physician:Benjamin Rosenstein, MD, MA [facility]

## 2021-10-14 ENCOUNTER — ANCILLARY PROCEDURE (OUTPATIENT)
Dept: ULTRASOUND IMAGING | Facility: CLINIC | Age: 21
End: 2021-10-14
Attending: FAMILY MEDICINE
Payer: COMMERCIAL

## 2021-10-14 DIAGNOSIS — Z76.89 ENCOUNTER TO ESTABLISH CARE WITH NEW DOCTOR: ICD-10-CM

## 2021-10-14 PROCEDURE — 76817 TRANSVAGINAL US OBSTETRIC: CPT | Performed by: RADIOLOGY

## 2021-10-14 PROCEDURE — 76801 OB US < 14 WKS SINGLE FETUS: CPT | Performed by: RADIOLOGY

## 2021-11-09 ENCOUNTER — OFFICE VISIT (OUTPATIENT)
Dept: FAMILY MEDICINE | Facility: CLINIC | Age: 21
End: 2021-11-09
Payer: COMMERCIAL

## 2021-11-09 VITALS
SYSTOLIC BLOOD PRESSURE: 107 MMHG | TEMPERATURE: 98 F | OXYGEN SATURATION: 98 % | WEIGHT: 122.4 LBS | BODY MASS INDEX: 21.82 KG/M2 | DIASTOLIC BLOOD PRESSURE: 71 MMHG | HEART RATE: 61 BPM

## 2021-11-09 DIAGNOSIS — R31.0 GROSS HEMATURIA: Primary | ICD-10-CM

## 2021-11-09 LAB
ALBUMIN UR-MCNC: NEGATIVE MG/DL
APPEARANCE UR: CLEAR
BACTERIA #/AREA URNS HPF: ABNORMAL /HPF
BILIRUB UR QL STRIP: NEGATIVE
COLOR UR AUTO: YELLOW
GLUCOSE UR STRIP-MCNC: NEGATIVE MG/DL
HGB UR QL STRIP: ABNORMAL
KETONES UR STRIP-MCNC: NEGATIVE MG/DL
LEUKOCYTE ESTERASE UR QL STRIP: NEGATIVE
NITRATE UR QL: NEGATIVE
PH UR STRIP: 7 [PH] (ref 5–8)
RBC #/AREA URNS AUTO: ABNORMAL /HPF
SP GR UR STRIP: 1.01 (ref 1–1.03)
SQUAMOUS #/AREA URNS AUTO: ABNORMAL /LPF
UROBILINOGEN UR STRIP-ACNC: 0.2 E.U./DL
WBC #/AREA URNS AUTO: ABNORMAL /HPF

## 2021-11-09 PROCEDURE — 87086 URINE CULTURE/COLONY COUNT: CPT | Performed by: PHYSICIAN ASSISTANT

## 2021-11-09 PROCEDURE — 99214 OFFICE O/P EST MOD 30 MIN: CPT | Performed by: PHYSICIAN ASSISTANT

## 2021-11-09 PROCEDURE — 81001 URINALYSIS AUTO W/SCOPE: CPT | Performed by: PHYSICIAN ASSISTANT

## 2021-11-09 ASSESSMENT — ENCOUNTER SYMPTOMS
VOMITING: 0
HEMATURIA: 1
DYSURIA: 0
FREQUENCY: 0
NAUSEA: 0
FLANK PAIN: 0
FEVER: 0
CHILLS: 0
ABDOMINAL PAIN: 1

## 2021-11-09 NOTE — PATIENT INSTRUCTIONS
1.  Your urinalysis is normal without signs of urinary tract infection.  I will culture your urine just to make sure and this will come back over the next couple of days.  2.  Your CT urogram was negative for any signs of kidney stones or cancers or problems in the kidney or bladder that would explain your blood.  3.  Recommend increasing fluid intake specifically water.  4.  Follow-up with your primary care provider if you have not had improvement in your symptoms over the course the next 5 days.

## 2021-11-09 NOTE — PROGRESS NOTES
Patient presents with:  Urinary Problem: peeing out blood x this morning      Clinical Decision Making: Patient experiencing gross hematuria that started today.  UA shows moderate blood, but no visible hematuria during collection.  No current findings concerning for urinary tract infection.  Urine culture was added to confirm.  Joint decision-making was used to decide upon CT urogram, which ruled out signs of cancer or nephrolithiasis.  Recommend watchful waiting for the next 5 days and follow-up with primary care if symptoms persist.  Patient is agreeable to this plan.      ICD-10-CM    1. Gross hematuria  R31.0 UA macro with reflex to Microscopic and Culture - Clinc Collect     Urine Microscopic     Urine Culture Aerobic Bacterial     CT Urogram wo & w Contrast     CT Urogram wo & w Contrast     Urine Culture Aerobic Bacterial     CANCELED: CT Urogram wo & w Contrast       Patient Instructions   1.  Your urinalysis is normal without signs of urinary tract infection.  I will culture your urine just to make sure and this will come back over the next couple of days.  2.  Your CT urogram was negative for any signs of kidney stones or cancers or problems in the kidney or bladder that would explain your blood.  3.  Recommend increasing fluid intake specifically water.  4.  Follow-up with your primary care provider if you have not had improvement in your symptoms over the course the next 5 days.      HPI:  Ruel Salter is a 21 year old female who presents today complaining of gross hematuria that started this morning.  Patient denies any significant urinary frequency or dysuria.  She denies any flank pain, fevers, chills, nausea, vomiting, vaginal itching, or vaginal discharge.  She denies any concerns for sexually transmitted diseases.  She has been experiencing some low abdominal cramping.  She denies any past medical history of nephrolithiasis.  She did have a past history of a previous urinary tract infection on  9/21/2021.  It was pansensitive E. coli and she was treated with Macrobid.    History obtained from the patient.    Problem List:  2020-07: Hallux abductovalgus with bunions, unspecified laterality      No past medical history on file.    Social History     Tobacco Use     Smoking status: Never Smoker     Smokeless tobacco: Never Used   Substance Use Topics     Alcohol use: Yes       Review of Systems   Constitutional: Negative for chills and fever.   Gastrointestinal: Positive for abdominal pain (low belly cramping). Negative for nausea and vomiting.   Genitourinary: Positive for hematuria. Negative for dysuria, flank pain, frequency, vaginal discharge and vaginal pain.       Vitals:    11/09/21 1041   BP: 107/71   BP Location: Right arm   Patient Position: Chair   Cuff Size: Adult Regular   Pulse: 61   Temp: 98  F (36.7  C)   TempSrc: Oral   SpO2: 98%   Weight: 55.5 kg (122 lb 6.4 oz)       Physical Exam  Vitals and nursing note reviewed.   Constitutional:       General: She is not in acute distress.     Appearance: She is not ill-appearing.   HENT:      Head: Normocephalic and atraumatic.      Right Ear: External ear normal.      Left Ear: External ear normal.   Eyes:      Conjunctiva/sclera: Conjunctivae normal.   Abdominal:      General: Abdomen is flat. There is no distension.      Tenderness: There is no abdominal tenderness. There is no right CVA tenderness, left CVA tenderness or guarding.   Neurological:      Mental Status: She is alert.   Psychiatric:         Mood and Affect: Mood normal.         Behavior: Behavior normal.         Thought Content: Thought content normal.         Judgment: Judgment normal.         Labs:  Results for orders placed or performed in visit on 11/09/21   CT Urogram wo & w Contrast     Status: None    Narrative    EXAM DATE:         11/09/2021    EXAM: CT ABDOMEN, PELVIS W/O and WITH FOR HEMATURIA  LOCATION: [facility]  DATE/TIME: 11/9/2021 12:00  PM    INDICATION: GROSS HEMATURIA  COMPARISON: None.  TECHNIQUE: CT scan of the abdomen and pelvis using urogram technique with pre contrast, post contrast, and delayed images. Multiplanar reformats were obtained. Dose reduction techniques were used.  CONTRAST: 100ml IV Isovue 370 administered.    FINDINGS:  LOWER CHEST: Normal.    HEPATOBILIARY: Normal.    PANCREAS: Normal.    SPLEEN: Normal.    ADRENAL GLANDS: Normal.    RIGHT KIDNEY/URETER: No renal or ureteral calculi. No hydronephrosis or hydroureter. Normal calyces. No filling defects in the opacified portions of the collecting system on delayed imaging.    LEFT KIDNEY/URETER: No renal or ureteral calculi. No hydronephrosis or hydroureter. Normal calyces. No filling defects in the opacified portions of the collecting system on delayed imaging.    BLADDER: Normal urinary bladder.    BOWEL: Normal.    LYMPH NODES: Normal.    VASCULATURE: Unremarkable.    PELVIC ORGANS: Normal.    MUSCULOSKELETAL: Normal.    IMPRESSION:  1.  No renal or ureteral calculi. No hydronephrosis or hydroureter. Normal calyces. No filling defects in the opacified portions of the collecting system on delayed imaging.             UA macro with reflex to Microscopic and Culture - Clinc Collect     Status: Abnormal    Specimen: Urine, Midstream   Result Value Ref Range    Color Urine Yellow Colorless, Straw, Light Yellow, Yellow    Appearance Urine Clear Clear    Glucose Urine Negative Negative mg/dL    Bilirubin Urine Negative Negative    Ketones Urine Negative Negative mg/dL    Specific Gravity Urine 1.015 1.005 - 1.030    Blood Urine Moderate (A) Negative    pH Urine 7.0 5.0 - 8.0    Protein Albumin Urine Negative Negative mg/dL    Urobilinogen Urine 0.2 0.2, 1.0 E.U./dL    Nitrite Urine Negative Negative    Leukocyte Esterase Urine Negative Negative   Urine Microscopic     Status: Abnormal   Result Value Ref Range    Bacteria Urine Few (A) None Seen /HPF    RBC Urine 0-2 0-2 /HPF /HPF     WBC Urine 0-5 0-5 /HPF /HPF    Squamous Epithelials Urine Few (A) None Seen /LPF    Narrative    Urine Culture not indicated         At the end of the encounter, I discussed results, diagnosis, medications. Discussed red flags for immediate return to clinic/ER, as well as indications for follow up if no improvement. Patient understood and agreed to plan. Patient was stable for discharge.

## 2021-11-10 LAB — BACTERIA UR CULT: NO GROWTH

## 2021-12-14 ENCOUNTER — OFFICE VISIT (OUTPATIENT)
Dept: FAMILY MEDICINE | Facility: CLINIC | Age: 21
End: 2021-12-14
Payer: COMMERCIAL

## 2021-12-14 VITALS
DIASTOLIC BLOOD PRESSURE: 63 MMHG | HEIGHT: 63 IN | OXYGEN SATURATION: 99 % | RESPIRATION RATE: 22 BRPM | HEART RATE: 68 BPM | WEIGHT: 127 LBS | SYSTOLIC BLOOD PRESSURE: 108 MMHG | BODY MASS INDEX: 22.5 KG/M2 | TEMPERATURE: 98 F

## 2021-12-14 DIAGNOSIS — Z00.00 ROUTINE GENERAL MEDICAL EXAMINATION AT A HEALTH CARE FACILITY: Primary | ICD-10-CM

## 2021-12-14 PROCEDURE — 90715 TDAP VACCINE 7 YRS/> IM: CPT | Performed by: STUDENT IN AN ORGANIZED HEALTH CARE EDUCATION/TRAINING PROGRAM

## 2021-12-14 PROCEDURE — G0123 SCREEN CERV/VAG THIN LAYER: HCPCS | Performed by: STUDENT IN AN ORGANIZED HEALTH CARE EDUCATION/TRAINING PROGRAM

## 2021-12-14 PROCEDURE — 87491 CHLMYD TRACH DNA AMP PROBE: CPT | Performed by: STUDENT IN AN ORGANIZED HEALTH CARE EDUCATION/TRAINING PROGRAM

## 2021-12-14 PROCEDURE — 90471 IMMUNIZATION ADMIN: CPT | Performed by: STUDENT IN AN ORGANIZED HEALTH CARE EDUCATION/TRAINING PROGRAM

## 2021-12-14 PROCEDURE — 87591 N.GONORRHOEAE DNA AMP PROB: CPT | Performed by: STUDENT IN AN ORGANIZED HEALTH CARE EDUCATION/TRAINING PROGRAM

## 2021-12-14 PROCEDURE — 99395 PREV VISIT EST AGE 18-39: CPT | Mod: 25 | Performed by: STUDENT IN AN ORGANIZED HEALTH CARE EDUCATION/TRAINING PROGRAM

## 2021-12-14 ASSESSMENT — MIFFLIN-ST. JEOR: SCORE: 1305.2

## 2021-12-14 NOTE — PATIENT INSTRUCTIONS
Preventive Health Recommendations  Female Ages 21 to 25     Yearly exam:     See your health care provider every year in order to  o Review health changes.   o Discuss preventive care.    o Review your medicines if your doctor has prescribed any.      You should be tested each year for STDs (sexually transmitted diseases).       Talk to your provider about how often you should have cholesterol testing.      Get a Pap test every three years. If you have an abnormal result, your doctor may have you test more often.      If you are at risk for diabetes, you should have a diabetes test (fasting glucose).     Shots:     Get a flu shot each year.     Get a tetanus shot every 10 years.     Consider getting the shot (vaccine) that prevents cervical cancer (Gardasil).    Nutrition:     Eat at least 5 servings of fruits and vegetables each day.    Eat whole-grain bread, whole-wheat pasta and brown rice instead of white grains and rice.    Get adequate Calcium and Vitamin D.     Lifestyle    Exercise at least 150 minutes a week each week (30 minutes a day, 5 days a week). This will help you control your weight and prevent disease.    Limit alcohol to one drink per day.    No smoking.     Wear sunscreen to prevent skin cancer.    See your dentist every six months for an exam and cleaning.      [email or web address]://[email or web address].org/birth-control    Once you decide on what type of birth control you would like please MyChart me or call the clinic and we can order it for you or set up an appointment to do a procedure should you want a Nexplanon or IUD.

## 2021-12-14 NOTE — PROGRESS NOTES
Female Physical Note    Concerns today: No special concerns today.      ROS:  CONSTITUTIONAL: no fatigue, no unexpected change in weight  SKIN: no worrisome rashes, no worrisome moles, no worrisome lesions  EYES: no acute vision problems or changes  ENT: no ear problems, no mouth problems, no throat problems  RESP: no significant cough, no shortness of breath  CV: no chest pain, no palpitations, no new or worsening peripheral edema  GI: no nausea, no vomiting, no constipation, no diarrhea    Sexually Active: Yes  Sexual concerns: No   Contraception:desires   P: 0 ; Elective  in 2021.  Menarche: age 13 Patient's last menstrual period was 2021.   STD History: Neg  Last Pap Smear Date:  First PAP today.    Patient Active Problem List   Diagnosis     Hallux abductovalgus with bunions, unspecified laterality       No current outpatient medications on file.       History reviewed. No pertinent past medical history.     Family History     Problem (# of Occurrences) Relation (Name,Age of Onset)    Hypertension (1) Paternal Grandmother    No Known Problems (2) Mother, Father       Negative family history of: Cancer, Diabetes, Coronary Artery Disease, Heart Disease, Thyroid Disease, Depression, Bleeding Diathesis, Asthma          Problem List Medication List and Allergy List were reviewed.    Patient is an established patient of this clinic..    Social History     Tobacco Use     Smoking status: Never Smoker     Smokeless tobacco: Never Used   Substance Use Topics     Alcohol use: Yes     Single  Children ? no    Has anyone hurt you physically, for example by pushing, hitting, slapping or kicking you or forcing you to have sex? Denies  Do you feel threatened or controlled by a partner, ex-partner or anyone in your life? Denies    RISK BEHAVIORS AND HEALTHY HABITS:  Tobacco Use/Smoking: None  Illicit Drug Use: None  Do you use alcohol? Yes  Number of drinking days a week 1, 1 glass of wine per  "week.  Diet (5-7 servings of fruits/veg daily): Yes   Exercise (30 min accumulated most days):Yes  Dental Care: No.   Calcium 1500 mg/d:  No  Seat Belt Use: Yes     Immunization History   Administered Date(s) Administered     COVID-19,PF,Pfizer (12+ Yrs) 06/23/2021, 07/14/2021     Comvax (HIB/HepB) 2000, 2000     DTAP (<7y) 1999, 1999, 1999, 06/28/2000, 06/30/2003     HPV9 01/25/2018, 03/27/2018, 07/27/2018     Hep B, Peds or Adolescent 1999     HepA-ped 2 Dose 06/16/2011, 08/25/2016     Hib, Unspecified 1999, 05/08/2000     Historic Hib Hib-titer 05/08/2000     Influenza Vaccine IM > 6 months Valent IIV4 (Alfuria,Fluzone) 10/13/2021     MMR 05/08/2000, 06/30/2003     Meningococcal (Menactra ) 06/16/2011, 08/25/2016     Meningococcal (Menveo ) 08/25/2016     Pneumococcal (PCV 7) 07/11/2001     Polio, Unspecified  1999, 1999, 1999, 06/30/2003     Poliovirus, inactivated (IPV) 1999, 1999, 1999, 06/30/2003     Tdap (Adacel,Boostrix) 06/16/2011, 12/14/2021     Varicella 10/05/2004, 06/16/2011    Reviewed Immunization Record Today    EXAMINATION:   /63   Pulse 68   Temp 98  F (36.7  C)   Resp 22   Ht 1.6 m (5' 3\")   Wt 57.6 kg (127 lb)   LMP 11/23/2021   SpO2 99%   BMI 22.50 kg/m    GENERAL: healthy, alert and no distress  EYES: Eyes grossly normal to inspection, extraocular movements - intact, and PERRL  HENT: ear canals- normal; TMs- normal; Nose- normal; Mouth- no ulcers, no lesions  NECK: no tenderness, no adenopathy, no asymmetry, no masses, no stiffness; thyroid- normal to palpation  RESP: lungs clear to auscultation - no rales, no rhonchi, no wheezes  BREAST: no masses, no tenderness, no nipple discharge, no palpable axillary masses or adenopathy  CV: regular rates and rhythm, normal S1 S2, no S3 or S4 and no murmur, no click or rub -  ABDOMEN: soft, no tenderness, no  hepatosplenomegaly, no masses, normal bowel sounds  MS: " extremities- no gross deformities noted, no edema  SKIN: no suspicious lesions, no rashes  NEURO: strength and tone- normal, sensory exam- grossly normal, mentation- intact, speech- normal, reflexes- symmetric  BACK: no CVA tenderness, no paralumbar tenderness  - female: cervix- normal, adnexae- normal; uterus- normal, no masses, no discharge  RECTAL- female: no masses, no hemorrhoids  PSYCH: Alert and oriented times 3; speech- coherent , normal rate and volume; able to articulate logical thoughts, able to abstract reason, no tangential thoughts, no hallucinations or delusions, affect- normal  LYMPHATICS: ant. cervical- normal, post. cervical- normal, axillary- normal, supraclavicular- normal, inguinal- normal    PAP and G/C pending.    ASSESSMENT:  1. Health Care Maintenance: Normal Physical Exam  2. Desires contraception    PLAN:  1. Routine follow up in one year.  2. Long discussion about contraceptive options, most interested at this time in the patch, though would like time to further consider. Options given and educational website provided. Plans to contact clinic or reach out via [email or web address] when she has decided.  3. Tdap given today, up to date on vaccinations.      Lizzy Curtis MD  [facility]

## 2021-12-14 NOTE — PROGRESS NOTES
Preceptor Attestation:   Patient seen, evaluated and discussed with the resident. I have verified the content of the note, which accurately reflects my assessment of the patient and the plan of care.  Supervising Physician:Ellen Luu MD  [facility]

## 2021-12-15 LAB
C TRACH DNA SPEC QL NAA+PROBE: NEGATIVE
N GONORRHOEA DNA SPEC QL NAA+PROBE: NEGATIVE

## 2021-12-17 LAB
BKR LAB AP GYN ADEQUACY: NORMAL
BKR LAB AP GYN INTERPRETATION: NORMAL
BKR LAB AP HPV REFLEX: NORMAL
BKR LAB AP LMP: NORMAL
BKR LAB AP PREVIOUS ABNORMAL: NORMAL
PATH REPORT.COMMENTS IMP SPEC: NORMAL
PATH REPORT.COMMENTS IMP SPEC: NORMAL
PATH REPORT.RELEVANT HX SPEC: NORMAL

## 2022-01-12 VITALS — BODY MASS INDEX: 23.57 KG/M2 | WEIGHT: 133 LBS | HEIGHT: 63 IN

## 2022-01-18 VITALS
DIASTOLIC BLOOD PRESSURE: 80 MMHG | SYSTOLIC BLOOD PRESSURE: 120 MMHG | RESPIRATION RATE: 17 BRPM | TEMPERATURE: 97.5 F | HEART RATE: 74 BPM

## 2022-07-19 ENCOUNTER — OFFICE VISIT (OUTPATIENT)
Dept: FAMILY MEDICINE | Facility: CLINIC | Age: 22
End: 2022-07-19
Payer: COMMERCIAL

## 2022-07-19 VITALS
WEIGHT: 129.9 LBS | HEART RATE: 73 BPM | RESPIRATION RATE: 20 BRPM | TEMPERATURE: 97.6 F | OXYGEN SATURATION: 97 % | DIASTOLIC BLOOD PRESSURE: 61 MMHG | BODY MASS INDEX: 23.01 KG/M2 | SYSTOLIC BLOOD PRESSURE: 93 MMHG

## 2022-07-19 DIAGNOSIS — R30.0 DYSURIA: Primary | ICD-10-CM

## 2022-07-19 LAB
ALBUMIN UR-MCNC: NEGATIVE MG/DL
APPEARANCE UR: ABNORMAL
BACTERIA #/AREA URNS HPF: ABNORMAL /HPF
BILIRUB UR QL STRIP: NEGATIVE
COLOR UR AUTO: YELLOW
GLUCOSE UR STRIP-MCNC: NEGATIVE MG/DL
HGB UR QL STRIP: ABNORMAL
KETONES UR STRIP-MCNC: NEGATIVE MG/DL
LEUKOCYTE ESTERASE UR QL STRIP: ABNORMAL
NITRATE UR QL: NEGATIVE
PH UR STRIP: 6.5 [PH] (ref 5–8)
RBC #/AREA URNS AUTO: ABNORMAL /HPF
SP GR UR STRIP: 1.01 (ref 1–1.03)
SQUAMOUS #/AREA URNS AUTO: ABNORMAL /LPF
UROBILINOGEN UR STRIP-ACNC: 0.2 E.U./DL
WBC #/AREA URNS AUTO: ABNORMAL /HPF
WBC CLUMPS #/AREA URNS HPF: PRESENT /HPF

## 2022-07-19 PROCEDURE — 87086 URINE CULTURE/COLONY COUNT: CPT | Performed by: PHYSICIAN ASSISTANT

## 2022-07-19 PROCEDURE — 99213 OFFICE O/P EST LOW 20 MIN: CPT | Performed by: PHYSICIAN ASSISTANT

## 2022-07-19 PROCEDURE — 81001 URINALYSIS AUTO W/SCOPE: CPT | Performed by: PHYSICIAN ASSISTANT

## 2022-07-19 NOTE — PATIENT INSTRUCTIONS
Patient was educated on the natural course of condition.  UA is not fully indicative of a UTI. She is menstruating which explains the hematuria. Will hold off on treatment since symptoms are improved today. Urine culture added and is pending. Will notify results. Conservative measures include drinking fluids (water), wiping from front to back, avoiding holding urine when there is urge to urinate, urinating before and after sexual intercourse, avoiding sexual activity until infection is eliminated, and over-the-counter AZO. See your primary care provider if symptoms do not improve in 3 days. Seek emergency care if you develop severe abdominal/flank pain, or fever.

## 2022-07-20 LAB — BACTERIA UR CULT: NORMAL

## 2022-07-24 ENCOUNTER — HOSPITAL ENCOUNTER (EMERGENCY)
Facility: HOSPITAL | Age: 22
Discharge: HOME | End: 2022-07-24
Attending: EMERGENCY MEDICINE | Admitting: EMERGENCY MEDICINE
Payer: COMMERCIAL

## 2022-07-24 ENCOUNTER — APPOINTMENT (OUTPATIENT)
Dept: CT IMAGING | Facility: HOSPITAL | Age: 22
End: 2022-07-24
Attending: EMERGENCY MEDICINE
Payer: COMMERCIAL

## 2022-07-24 VITALS
DIASTOLIC BLOOD PRESSURE: 61 MMHG | RESPIRATION RATE: 22 BRPM | SYSTOLIC BLOOD PRESSURE: 98 MMHG | HEART RATE: 74 BPM | TEMPERATURE: 98.6 F | OXYGEN SATURATION: 98 %

## 2022-07-24 DIAGNOSIS — N10 ACUTE PYELONEPHRITIS: ICD-10-CM

## 2022-07-24 LAB
ALBUMIN SERPL-MCNC: 3.6 G/DL (ref 3.5–5)
ALBUMIN UR-MCNC: 50 MG/DL
ALP SERPL-CCNC: 62 U/L (ref 45–120)
ALT SERPL W P-5'-P-CCNC: 14 U/L (ref 0–45)
ANION GAP SERPL CALCULATED.3IONS-SCNC: 9 MMOL/L (ref 5–18)
APPEARANCE UR: ABNORMAL
AST SERPL W P-5'-P-CCNC: 16 U/L (ref 0–40)
BACTERIA #/AREA URNS HPF: ABNORMAL /HPF
BASOPHILS # BLD AUTO: 0 10E3/UL (ref 0–0.2)
BASOPHILS NFR BLD AUTO: 0 %
BILIRUB DIRECT SERPL-MCNC: 0.3 MG/DL
BILIRUB SERPL-MCNC: 0.9 MG/DL (ref 0–1)
BILIRUB UR QL STRIP: NEGATIVE
BUN SERPL-MCNC: 6 MG/DL (ref 8–22)
CALCIUM SERPL-MCNC: 9.4 MG/DL (ref 8.5–10.5)
CHLORIDE BLD-SCNC: 103 MMOL/L (ref 98–107)
CO2 SERPL-SCNC: 21 MMOL/L (ref 22–31)
COLOR UR AUTO: YELLOW
CREAT SERPL-MCNC: 0.77 MG/DL (ref 0.6–1.1)
EOSINOPHIL # BLD AUTO: 0.5 10E3/UL (ref 0–0.7)
EOSINOPHIL NFR BLD AUTO: 4 %
ERYTHROCYTE [DISTWIDTH] IN BLOOD BY AUTOMATED COUNT: 14.4 % (ref 10–15)
FLUAV RNA SPEC QL NAA+PROBE: NEGATIVE
FLUBV RNA RESP QL NAA+PROBE: NEGATIVE
GFR SERPL CREATININE-BSD FRML MDRD: >90 ML/MIN/1.73M2
GLUCOSE BLD-MCNC: 124 MG/DL (ref 70–125)
GLUCOSE UR STRIP-MCNC: NEGATIVE MG/DL
HCG SERPL-ACNC: <2 MLU/ML (ref 0–4)
HCT VFR BLD AUTO: 42.5 % (ref 35–47)
HGB BLD-MCNC: 13.6 G/DL (ref 11.7–15.7)
HGB UR QL STRIP: ABNORMAL
IMM GRANULOCYTES # BLD: 0.1 10E3/UL
IMM GRANULOCYTES NFR BLD: 1 %
KETONES UR STRIP-MCNC: NEGATIVE MG/DL
LEUKOCYTE ESTERASE UR QL STRIP: ABNORMAL
LYMPHOCYTES # BLD AUTO: 1.1 10E3/UL (ref 0.8–5.3)
LYMPHOCYTES NFR BLD AUTO: 8 %
MCH RBC QN AUTO: 25.7 PG (ref 26.5–33)
MCHC RBC AUTO-ENTMCNC: 32 G/DL (ref 31.5–36.5)
MCV RBC AUTO: 80 FL (ref 78–100)
MONOCYTES # BLD AUTO: 1.7 10E3/UL (ref 0–1.3)
MONOCYTES NFR BLD AUTO: 14 %
MUCOUS THREADS #/AREA URNS LPF: PRESENT /LPF
NEUTROPHILS # BLD AUTO: 9.6 10E3/UL (ref 1.6–8.3)
NEUTROPHILS NFR BLD AUTO: 73 %
NITRATE UR QL: POSITIVE
NRBC # BLD AUTO: 0 10E3/UL
NRBC BLD AUTO-RTO: 0 /100
PH UR STRIP: 6.5 [PH] (ref 5–7)
PLATELET # BLD AUTO: 256 10E3/UL (ref 150–450)
POTASSIUM BLD-SCNC: 3.5 MMOL/L (ref 3.5–5)
PROT SERPL-MCNC: 8 G/DL (ref 6–8)
RBC # BLD AUTO: 5.29 10E6/UL (ref 3.8–5.2)
RBC URINE: 5 /HPF
SARS-COV-2 RNA RESP QL NAA+PROBE: NEGATIVE
SODIUM SERPL-SCNC: 133 MMOL/L (ref 136–145)
SP GR UR STRIP: 1.01 (ref 1–1.03)
SQUAMOUS EPITHELIAL: 1 /HPF
UROBILINOGEN UR STRIP-MCNC: <2 MG/DL
WBC # BLD AUTO: 12.9 10E3/UL (ref 4–11)
WBC CLUMPS #/AREA URNS HPF: PRESENT /HPF
WBC URINE: >182 /HPF

## 2022-07-24 PROCEDURE — 82310 ASSAY OF CALCIUM: CPT | Performed by: EMERGENCY MEDICINE

## 2022-07-24 PROCEDURE — 96365 THER/PROPH/DIAG IV INF INIT: CPT | Mod: 59

## 2022-07-24 PROCEDURE — 36415 COLL VENOUS BLD VENIPUNCTURE: CPT | Performed by: EMERGENCY MEDICINE

## 2022-07-24 PROCEDURE — 250N000013 HC RX MED GY IP 250 OP 250 PS 637: Performed by: STUDENT IN AN ORGANIZED HEALTH CARE EDUCATION/TRAINING PROGRAM

## 2022-07-24 PROCEDURE — 85025 COMPLETE CBC W/AUTO DIFF WBC: CPT | Performed by: EMERGENCY MEDICINE

## 2022-07-24 PROCEDURE — C9803 HOPD COVID-19 SPEC COLLECT: HCPCS

## 2022-07-24 PROCEDURE — 96361 HYDRATE IV INFUSION ADD-ON: CPT

## 2022-07-24 PROCEDURE — 250N000011 HC RX IP 250 OP 636: Performed by: EMERGENCY MEDICINE

## 2022-07-24 PROCEDURE — 96366 THER/PROPH/DIAG IV INF ADDON: CPT | Mod: 59

## 2022-07-24 PROCEDURE — 87636 SARSCOV2 & INF A&B AMP PRB: CPT | Performed by: EMERGENCY MEDICINE

## 2022-07-24 PROCEDURE — 74177 CT ABD & PELVIS W/CONTRAST: CPT

## 2022-07-24 PROCEDURE — 99285 EMERGENCY DEPT VISIT HI MDM: CPT | Mod: 25

## 2022-07-24 PROCEDURE — 87086 URINE CULTURE/COLONY COUNT: CPT | Performed by: STUDENT IN AN ORGANIZED HEALTH CARE EDUCATION/TRAINING PROGRAM

## 2022-07-24 PROCEDURE — 82248 BILIRUBIN DIRECT: CPT | Performed by: EMERGENCY MEDICINE

## 2022-07-24 PROCEDURE — 81001 URINALYSIS AUTO W/SCOPE: CPT | Performed by: STUDENT IN AN ORGANIZED HEALTH CARE EDUCATION/TRAINING PROGRAM

## 2022-07-24 PROCEDURE — 84702 CHORIONIC GONADOTROPIN TEST: CPT | Performed by: EMERGENCY MEDICINE

## 2022-07-24 PROCEDURE — 258N000003 HC RX IP 258 OP 636: Performed by: EMERGENCY MEDICINE

## 2022-07-24 RX ORDER — IOPAMIDOL 755 MG/ML
75 INJECTION, SOLUTION INTRAVASCULAR ONCE
Status: COMPLETED | OUTPATIENT
Start: 2022-07-24 | End: 2022-07-24

## 2022-07-24 RX ORDER — CEFPODOXIME PROXETIL 200 MG/1
200 TABLET, FILM COATED ORAL 2 TIMES DAILY
Qty: 28 TABLET | Refills: 0 | Status: SHIPPED | OUTPATIENT
Start: 2022-07-24 | End: 2022-08-07

## 2022-07-24 RX ORDER — CEFTRIAXONE 1 G/1
1 INJECTION, POWDER, FOR SOLUTION INTRAMUSCULAR; INTRAVENOUS ONCE
Status: COMPLETED | OUTPATIENT
Start: 2022-07-24 | End: 2022-07-24

## 2022-07-24 RX ORDER — IBUPROFEN 200 MG
400 TABLET ORAL ONCE
Status: COMPLETED | OUTPATIENT
Start: 2022-07-24 | End: 2022-07-24

## 2022-07-24 RX ADMIN — IOPAMIDOL 75 ML: 755 INJECTION, SOLUTION INTRAVENOUS at 14:52

## 2022-07-24 RX ADMIN — CEFTRIAXONE SODIUM 1 G: 1 INJECTION, POWDER, FOR SOLUTION INTRAMUSCULAR; INTRAVENOUS at 15:07

## 2022-07-24 RX ADMIN — SODIUM CHLORIDE 1000 ML: 9 INJECTION, SOLUTION INTRAVENOUS at 12:44

## 2022-07-24 RX ADMIN — IBUPROFEN 400 MG: 200 TABLET, FILM COATED ORAL at 12:40

## 2022-07-24 ASSESSMENT — ENCOUNTER SYMPTOMS
FEVER: 1
SORE THROAT: 0
FATIGUE: 1
FLANK PAIN: 1
RHINORRHEA: 0
HEMATURIA: 1
SHORTNESS OF BREATH: 0
CHILLS: 1
ABDOMINAL PAIN: 1

## 2022-07-24 NOTE — ED PROVIDER NOTES
EMERGENCY DEPARTMENT ENCOUNTER      NAME: Ruel Salter  AGE: 22 year old female  YOB: 2000  MRN: [id number]  EVALUATION DATE & TIME: No admission date for patient encounter.    PCP: Morena Lopez    ED PROVIDER: Annetta Pizarro M.D.      CHIEF COMPLAINT     Chief Complaint   Patient presents with     Fever         FINAL IMPRESSION:     1. Acute pyelonephritis          MEDICAL DECISION MAKING:       Pertinent Labs & Imaging studies reviewed. (See chart for details)    22 year old female presents to the Emergency Department for evaluation of fever.    Patient presents with her mother.  Dysuria hematuria and frequency.  Recent menstruation   on 7/19 told urine not infected  Now chills fever and left flank pain  Just finished her period   Does use tampons  Does not currently have a tampoon  Urine culture from 7/19 mixed organism    On exam well appearing non toxic  Tachycardic febrile  Left flank ttp  No rashes    ua concerning for infection  Started on NS tylenol ibuprofen and rocephin  CT done no stone + pyelo no abscess    Spoke with patient and mother at length  She is reliable, no pregnant, no vomiting no ureteral stone feels much better after fluids antipyretics  Has follow up and no surgical needs at this moment,  Rx sent to pharmacy  Discharged ambulatory in stable condition      Differential Diagnosis (include but not limited to)  Pyelonephritis, renal colic, diverticulitis, appendicitis covid TSS  among others      Vital Signs: febrile tachycardic  EKG: none  Imaging: CT pyelo no abscess  Home Meds: reviewed  ED meds/abx: rocephin tylenol motrin  Fluids: 1 LNS    Labs  K 3.5  Cr 0.77  Wbc 12.9  Hgb 13.6  Platelets 256  Normal LFT  ua + leuk + nitrites      Review of Previous Records  Patient was seen at  on 7/19 (5 days ago) for dysuria and frequency x1 day. She thought that she had hematuria at the time but this was attributed to her menstrual cycle. UA and urine culture were negative.        Consults      ED COURSE   1:07 PM I met with patient for initial interview and encounter in triage. PPE worn includes N95 mask.  2:34 PM I rechecked and updated the patient   3:20 PM updated on CT results discusses at length discharge instructions and return precautions       At the conclusion of the encounter I discussed the results of all of the tests and the disposition. The questions were answered. The patient and mother acknowledged understanding and was agreeable with the care plan.           MEDICATIONS GIVEN IN THE EMERGENCY:     Medications   ibuprofen (ADVIL/MOTRIN) tablet 400 mg (400 mg Oral Given 7/24/22 1240)   0.9% sodium chloride BOLUS (0 mLs Intravenous Stopped 7/24/22 1338)   cefTRIAXone (ROCEPHIN) 1 g vial to attach to  mL bag for ADULTS or NS 50 mL bag for PEDS (0 g Intravenous Stopped 7/24/22 1538)   iopamidol (ISOVUE-370) solution 75 mL (75 mLs Intravenous Given 7/24/22 1452)       NEW PRESCRIPTIONS STARTED AT TODAY'S ER VISIT     Discharge Medication List as of 7/24/2022  3:39 PM      START taking these medications    Details   cefpodoxime (VANTIN) 200 MG tablet Take 1 tablet (200 mg) by mouth 2 times daily for 14 days, Disp-28 tablet, R-0, E-Prescribe                =================================================================    HPI     Patient information was obtained from: Patient    Use of : N/A         Ruel Salter is a 22 year old female with no pertinent medical history who presents via walk-in for evaluation of fever.    Patient had onset of left flank pain three days ago while using an exercise machine. She reports feeling normal the week before and she adds that she felt fatigued and weak the day after onset of flank pain. Patient had chills yesterday and is resolved upon arrival. In the ED, she has left flank pain, upper abdominal pain, and fever of 103F. She finished her LMP recently. She denies runny nose, shortness of breath, leg swelling, rash, sore  throat, and any other complaints.    REVIEW OF SYSTEMS   Review of Systems   Constitutional: Positive for chills, fatigue and fever.   HENT: Negative for rhinorrhea and sore throat.    Respiratory: Negative for shortness of breath.    Cardiovascular: Negative for chest pain and leg swelling.   Gastrointestinal: Positive for abdominal pain.   Genitourinary: Positive for flank pain and hematuria.   Skin: Negative for rash.        PAST MEDICAL HISTORY:   History reviewed. No pertinent past medical history.    PAST SURGICAL HISTORY:     Past Surgical History:   Procedure Laterality Date     NO PAST SURGERIES           CURRENT MEDICATIONS:   cefpodoxime (VANTIN) 200 MG tablet         ALLERGIES:   No Known Allergies    FAMILY HISTORY:     Family History   Problem Relation Age of Onset     No Known Problems Mother      No Known Problems Father      Hypertension Paternal Grandmother      Cancer No family hx of      Diabetes No family hx of      Coronary Artery Disease No family hx of      Heart Disease No family hx of      Thyroid Disease No family hx of      Depression No family hx of      Bleeding Diathesis No family hx of      Asthma No family hx of        SOCIAL HISTORY:     Social History     Socioeconomic History     Marital status: Single   Tobacco Use     Smoking status: Never Smoker     Smokeless tobacco: Never Used   Vaping Use     Vaping Use: Never used   Substance and Sexual Activity     Alcohol use: Yes     Drug use: Never     Sexual activity: Yes     Partners: Male     Birth control/protection: None   Social History Narrative    ** Merged History Encounter **         Lives with parents and 4 siblings.  Student at [employer]--business administration, sales, economics minor       VITALS:   BP 98/61   Pulse 74   Temp 98.6  F (37  C)   Resp 22   LMP 07/18/2022 (Exact Date)   SpO2 98%     PHYSICAL EXAM     Physical Exam  Vitals and nursing note reviewed. Exam conducted with a chaperone present.    Constitutional:       General: She is not in acute distress.     Appearance: Normal appearance. She is normal weight. She is not ill-appearing, toxic-appearing or diaphoretic.   Cardiovascular:      Rate and Rhythm: Tachycardia present.   Musculoskeletal:         General: Tenderness present.      Cervical back: Neck supple. No rigidity.      Comments: Left CVA TTP   Skin:     General: Skin is warm.      Capillary Refill: Capillary refill takes less than 2 seconds.   Neurological:      General: No focal deficit present.      Mental Status: She is alert and oriented to person, place, and time.         Physical Exam   Constitutional: Well appearing, cooperative, pleasant    Head: Atraumatic.     Nose: Nose normal.     Mouth/Throat: Oropharynx is clear and moist.     Eyes: EOM are normal. Pupils are equal, round, and reactive to light.     Ears: Bilateral pearly white    Neck: Normal range of motion. Neck supple.     Cardiovascular: Tachycardic, regular rhythm and normal heart sounds.      Pulmonary/Chest: Normal effort  and breath sounds normal.     Abdominal: Soft, nontender.    Musculoskeletal: Left flank tender to palpation. Normal range of motion.     Neurological: No deficits    Lymphatics: No edema    : N/A    Skin: Skin is warm and dry. No rashes    Psychiatric: Normal mood and affect. Behavior is normal.       LAB:     All pertinent labs reviewed and interpreted.  Labs Ordered and Resulted from Time of ED Arrival to Time of ED Departure   BASIC METABOLIC PANEL - Abnormal       Result Value    Sodium 133 (*)     Potassium 3.5      Chloride 103      Carbon Dioxide (CO2) 21 (*)     Anion Gap 9      Urea Nitrogen 6 (*)     Creatinine 0.77      Calcium 9.4      Glucose 124      GFR Estimate >90     CBC WITH PLATELETS AND DIFFERENTIAL - Abnormal    WBC Count 12.9 (*)     RBC Count 5.29 (*)     Hemoglobin 13.6      Hematocrit 42.5      MCV 80      MCH 25.7 (*)     MCHC 32.0      RDW 14.4      Platelet Count 256       % Neutrophils 73      % Lymphocytes 8      % Monocytes 14      % Eosinophils 4      % Basophils 0      % Immature Granulocytes 1      NRBCs per 100 WBC 0      Absolute Neutrophils 9.6 (*)     Absolute Lymphocytes 1.1      Absolute Monocytes 1.7 (*)     Absolute Eosinophils 0.5      Absolute Basophils 0.0      Absolute Immature Granulocytes 0.1      Absolute NRBCs 0.0     ROUTINE UA WITH MICROSCOPIC REFLEX TO CULTURE - Abnormal    Color Urine Yellow      Appearance Urine Turbid (*)     Glucose Urine Negative      Bilirubin Urine Negative      Ketones Urine Negative      Specific Gravity Urine 1.015      Blood Urine 0.2 mg/dL (*)     pH Urine 6.5      Protein Albumin Urine 50  (*)     Urobilinogen Urine <2.0      Nitrite Urine Positive (*)     Leukocyte Esterase Urine 500 Amelia/uL (*)     Bacteria Urine Moderate (*)     WBC Clumps Urine Present (*)     Mucus Urine Present (*)     RBC Urine 5 (*)     WBC Urine >182 (*)     Squamous Epithelials Urine 1     INFLUENZA A/B & SARS-COV2 PCR MULTIPLEX - Normal    Influenza A PCR Negative      Influenza B PCR Negative      SARS CoV2 PCR Negative     HEPATIC FUNCTION PANEL - Normal    Bilirubin Total 0.9      Bilirubin Direct 0.3      Protein Total 8.0      Albumin 3.6      Alkaline Phosphatase 62      AST 16      ALT 14     HCG QUANTITATIVE PREGNANCY - Normal    hCG Quantitative <2     URINE CULTURE        RADIOLOGY:     Reviewed all pertinent imaging. Please see official radiology report.  CT Abdomen Pelvis w Contrast   Final Result   IMPRESSION:    1.  Generalized left pyelonephritis with areas of more focal nonliquefactive pyelonephritis in the anterolateral aspect mid left kidney, urothelial inflammation of the left intrarenal collecting system and ureter and cystitis as detailed above.   2.  Right kidney and ureter are normal.           EKG:       I have independently reviewed and interpreted the EKG(s) documented above.      PROCEDURES:     Chance Fox  Le, am serving as a scribe to document services personally performed by Dr. Pizarro based on my observation and the provider's statements to me. I, Annetta Pizarro MD attest that Chance Lujan is acting in a scribe capacity, has observed my performance of the services and has documented them in accordance with my direction.    Annetta Pizarro M.D.  Emergency Medicine  [facility] EMERGENCY DEPARTMENT  [address] [place] [id number] [phone]  Dept: [phone]     Annetta Pizarro MD  07/24/22 1627       Annetta Pizarro MD  07/24/22 1242

## 2022-07-24 NOTE — DISCHARGE INSTRUCTIONS
Read and follow the discharge instructions.    You have a kidney infection.    make sure you take your antibiotics as instructed.    Call your primary care doctor tomorrow to make a follow-up appointment for reevaluation.   You Must be seen within the next 48 hours.    Return immediately or call 911 if you have worsening symptoms not improving or any other concerns.    Take Tylenol and ibuprofen as needed for pain and fever.

## 2022-07-24 NOTE — ED TRIAGE NOTES
Pt has had a fever, weakness, headache and fatigue for 3 days. Pt took Tylenol at 0900 this am.     Triage Assessment     Row Name 07/24/22 1219       Triage Assessment (Adult)    Airway WDL WDL       Respiratory WDL    Respiratory WDL WDL       Skin Circulation/Temperature WDL    Skin Circulation/Temperature WDL WDL       Cardiac WDL    Cardiac WDL WDL       Peripheral/Neurovascular WDL    Peripheral Neurovascular WDL WDL       Cognitive/Neuro/Behavioral WDL    Cognitive/Neuro/Behavioral WDL WDL

## 2022-07-25 ENCOUNTER — PATIENT OUTREACH (OUTPATIENT)
Dept: FAMILY MEDICINE | Facility: CLINIC | Age: 22
End: 2022-07-25

## 2022-07-26 ENCOUNTER — HOSPITAL ENCOUNTER (EMERGENCY)
Facility: HOSPITAL | Age: 22
Discharge: HOME | End: 2022-07-26
Attending: FAMILY MEDICINE | Admitting: FAMILY MEDICINE
Payer: COMMERCIAL

## 2022-07-26 ENCOUNTER — PATIENT OUTREACH (OUTPATIENT)
Dept: FAMILY MEDICINE | Facility: CLINIC | Age: 22
End: 2022-07-26

## 2022-07-26 ENCOUNTER — APPOINTMENT (OUTPATIENT)
Dept: ULTRASOUND IMAGING | Facility: HOSPITAL | Age: 22
End: 2022-07-26
Attending: FAMILY MEDICINE
Payer: COMMERCIAL

## 2022-07-26 VITALS
HEIGHT: 63 IN | TEMPERATURE: 99.4 F | RESPIRATION RATE: 16 BRPM | OXYGEN SATURATION: 96 % | BODY MASS INDEX: 23.04 KG/M2 | DIASTOLIC BLOOD PRESSURE: 57 MMHG | SYSTOLIC BLOOD PRESSURE: 107 MMHG | HEART RATE: 62 BPM | WEIGHT: 130 LBS

## 2022-07-26 DIAGNOSIS — N10 ACUTE PYELONEPHRITIS: ICD-10-CM

## 2022-07-26 LAB
ANION GAP SERPL CALCULATED.3IONS-SCNC: 10 MMOL/L (ref 5–18)
BACTERIA UR CULT: ABNORMAL
BASOPHILS # BLD AUTO: 0 10E3/UL (ref 0–0.2)
BASOPHILS NFR BLD AUTO: 0 %
BUN SERPL-MCNC: 4 MG/DL (ref 8–22)
CALCIUM SERPL-MCNC: 8.8 MG/DL (ref 8.5–10.5)
CHLORIDE BLD-SCNC: 104 MMOL/L (ref 98–107)
CO2 SERPL-SCNC: 20 MMOL/L (ref 22–31)
CREAT SERPL-MCNC: 0.66 MG/DL (ref 0.6–1.1)
EOSINOPHIL # BLD AUTO: 0 10E3/UL (ref 0–0.7)
EOSINOPHIL NFR BLD AUTO: 0 %
ERYTHROCYTE [DISTWIDTH] IN BLOOD BY AUTOMATED COUNT: 14.3 % (ref 10–15)
FLUAV RNA SPEC QL NAA+PROBE: NEGATIVE
FLUBV RNA RESP QL NAA+PROBE: NEGATIVE
GFR SERPL CREATININE-BSD FRML MDRD: >90 ML/MIN/1.73M2
GLUCOSE BLD-MCNC: 117 MG/DL (ref 70–125)
HCT VFR BLD AUTO: 39.5 % (ref 35–47)
HGB BLD-MCNC: 12.9 G/DL (ref 11.7–15.7)
IMM GRANULOCYTES # BLD: 0 10E3/UL
IMM GRANULOCYTES NFR BLD: 0 %
LACTATE SERPL-SCNC: 0.7 MMOL/L (ref 0.7–2)
LYMPHOCYTES # BLD AUTO: 0.7 10E3/UL (ref 0.8–5.3)
LYMPHOCYTES NFR BLD AUTO: 7 %
MAGNESIUM SERPL-MCNC: 1.6 MG/DL (ref 1.8–2.6)
MCH RBC QN AUTO: 25.9 PG (ref 26.5–33)
MCHC RBC AUTO-ENTMCNC: 32.7 G/DL (ref 31.5–36.5)
MCV RBC AUTO: 79 FL (ref 78–100)
MONOCYTES # BLD AUTO: 1 10E3/UL (ref 0–1.3)
MONOCYTES NFR BLD AUTO: 10 %
NEUTROPHILS # BLD AUTO: 8.2 10E3/UL (ref 1.6–8.3)
NEUTROPHILS NFR BLD AUTO: 83 %
NRBC # BLD AUTO: 0 10E3/UL
NRBC BLD AUTO-RTO: 0 /100
PLATELET # BLD AUTO: 220 10E3/UL (ref 150–450)
POTASSIUM BLD-SCNC: 3.4 MMOL/L (ref 3.5–5)
PROCALCITONIN SERPL-MCNC: 1.05 NG/ML (ref 0–0.49)
RBC # BLD AUTO: 4.98 10E6/UL (ref 3.8–5.2)
SARS-COV-2 RNA RESP QL NAA+PROBE: NEGATIVE
SODIUM SERPL-SCNC: 134 MMOL/L (ref 136–145)
WBC # BLD AUTO: 10 10E3/UL (ref 4–11)

## 2022-07-26 PROCEDURE — 84145 PROCALCITONIN (PCT): CPT | Performed by: FAMILY MEDICINE

## 2022-07-26 PROCEDURE — 87040 BLOOD CULTURE FOR BACTERIA: CPT | Performed by: FAMILY MEDICINE

## 2022-07-26 PROCEDURE — 99285 EMERGENCY DEPT VISIT HI MDM: CPT | Mod: 25

## 2022-07-26 PROCEDURE — 36415 COLL VENOUS BLD VENIPUNCTURE: CPT | Performed by: FAMILY MEDICINE

## 2022-07-26 PROCEDURE — 84295 ASSAY OF SERUM SODIUM: CPT | Performed by: FAMILY MEDICINE

## 2022-07-26 PROCEDURE — 96361 HYDRATE IV INFUSION ADD-ON: CPT

## 2022-07-26 PROCEDURE — 258N000003 HC RX IP 258 OP 636: Performed by: FAMILY MEDICINE

## 2022-07-26 PROCEDURE — 76770 US EXAM ABDO BACK WALL COMP: CPT

## 2022-07-26 PROCEDURE — 83605 ASSAY OF LACTIC ACID: CPT | Performed by: FAMILY MEDICINE

## 2022-07-26 PROCEDURE — C9803 HOPD COVID-19 SPEC COLLECT: HCPCS

## 2022-07-26 PROCEDURE — 250N000011 HC RX IP 250 OP 636: Performed by: FAMILY MEDICINE

## 2022-07-26 PROCEDURE — 85025 COMPLETE CBC W/AUTO DIFF WBC: CPT | Performed by: FAMILY MEDICINE

## 2022-07-26 PROCEDURE — 83735 ASSAY OF MAGNESIUM: CPT | Performed by: FAMILY MEDICINE

## 2022-07-26 PROCEDURE — 87636 SARSCOV2 & INF A&B AMP PRB: CPT | Performed by: FAMILY MEDICINE

## 2022-07-26 PROCEDURE — 250N000013 HC RX MED GY IP 250 OP 250 PS 637: Performed by: FAMILY MEDICINE

## 2022-07-26 PROCEDURE — 96374 THER/PROPH/DIAG INJ IV PUSH: CPT

## 2022-07-26 RX ORDER — SODIUM CHLORIDE 9 MG/ML
INJECTION, SOLUTION INTRAVENOUS CONTINUOUS
Status: DISCONTINUED | OUTPATIENT
Start: 2022-07-26 | End: 2022-07-26

## 2022-07-26 RX ORDER — ACETAMINOPHEN 325 MG/1
650 TABLET ORAL ONCE
Status: DISCONTINUED | OUTPATIENT
Start: 2022-07-26 | End: 2022-07-26 | Stop reason: HOSPADM

## 2022-07-26 RX ORDER — ACETAMINOPHEN 325 MG/1
975 TABLET ORAL ONCE
Status: COMPLETED | OUTPATIENT
Start: 2022-07-26 | End: 2022-07-26

## 2022-07-26 RX ORDER — KETOROLAC TROMETHAMINE 15 MG/ML
15 INJECTION, SOLUTION INTRAMUSCULAR; INTRAVENOUS ONCE
Status: COMPLETED | OUTPATIENT
Start: 2022-07-26 | End: 2022-07-26

## 2022-07-26 RX ADMIN — SODIUM CHLORIDE 1000 ML: 9 INJECTION, SOLUTION INTRAVENOUS at 03:47

## 2022-07-26 RX ADMIN — ACETAMINOPHEN 975 MG: 325 TABLET ORAL at 04:07

## 2022-07-26 RX ADMIN — KETOROLAC TROMETHAMINE 15 MG: 15 INJECTION, SOLUTION INTRAMUSCULAR; INTRAVENOUS at 04:09

## 2022-07-26 ASSESSMENT — ENCOUNTER SYMPTOMS
PHOTOPHOBIA: 1
NAUSEA: 1
DIARRHEA: 1
FLANK PAIN: 1
FEVER: 1
RHINORRHEA: 0
VOMITING: 0
HEADACHES: 1

## 2022-07-26 NOTE — ED TRIAGE NOTES
Patient c/o Left flank pain , was diagnosed with kidney infection 2 days ago.  Patient has fever and headache.  Patient took tylenol  At 8 pm.      Triage Assessment     Row Name 07/26/22 0142       Triage Assessment (Adult)    Airway WDL WDL       Respiratory WDL    Respiratory WDL WDL       Skin Circulation/Temperature WDL    Skin Circulation/Temperature WDL temperature    Skin Temperature warm       Cardiac WDL    Cardiac WDL WDL       Peripheral/Neurovascular WDL    Peripheral Neurovascular WDL WDL       Cognitive/Neuro/Behavioral WDL    Cognitive/Neuro/Behavioral WDL WDL

## 2022-07-26 NOTE — ED PROVIDER NOTES
6 AM.  Ultrasound with findings consistent with a pyelonephritis.  No other acute findings.  Patient with slight headache.  Tylenol given prior to discharge.     Eric Fletcher MD  07/26/22 0600

## 2022-07-26 NOTE — ED PROVIDER NOTES
EMERGENCY DEPARTMENT ENCOUNTER      NAME: Ruel Salter  AGE: 22 year old female  YOB: 2000  MRN: [id number]  EVALUATION DATE & TIME: 7/26/2022  2:40 AM    PCP: Morena Lopez    ED PROVIDER: Jesus Peters M.D.    Chief Complaint   Patient presents with     Headache     Flank Pain       FINAL IMPRESSION:  1. Acute pyelonephritis        ED COURSE & MEDICAL DECISION MAKING:    Pertinent Labs & Imaging studies personally reviewed and interpreted by me. (See chart for details)  2:54 AM Patient seen and examined, prior records reviewed.  Differential diagnosis includes but not limited to strep throat, influenza, pneumonia, urinary tract infection, pyelonephritis, diverticulitis, bacteremia, medication reaction, sepsis, cellulitis.  Patient recently diagnosed with pyelonephritis, presents with headache and fever.  On exam here, she is febrile and tachycardic.  No encephalopathy, no nuchal rigidity, no photophobia.  Acute meningitis or encephalitis is unlikely.  Patient could have a secondary COVID or influenza infection, however most likely her headache is due to generalized illness and fever.  Fluids, Toradol, Tylenol are ordered along with labs  4:28 AM labs are reassuring including normal white blood cell count, normal lactate.  Renal ultrasound is pending to evaluate for fluid collection or abscess, otherwise patient is stable for discharge with continued outpatient antibiotics.    At the conclusion of the encounter I discussed the results of all of the tests and the disposition. The questions were answered. The patient or family acknowledged understanding and was agreeable with the care plan.     PROCEDURES:   Procedures    MEDICATIONS GIVEN IN THE EMERGENCY:  Medications   0.9% sodium chloride BOLUS (1,000 mLs Intravenous New Bag 7/26/22 3692)   acetaminophen (TYLENOL) tablet 975 mg (975 mg Oral Given 7/26/22 1937)   ketorolac (TORADOL) injection 15 mg (15 mg Intravenous Given 7/26/22 9948)        NEW PRESCRIPTIONS STARTED AT TODAY'S ER VISIT  New Prescriptions    No medications on file       =================================================================    HPI    Patient information was obtained from: Patient      Ruel Salter is a 22 year old female with no pertinent history who presents to this ED by walk in for evaluation of headache.    Patient reports that she was seen here about a day and a half ago and was diagnosed with a kidney infection and discharged with antibiotics which she has been taking since. Her last dose was around 2000 that she took with tylenol for her headache that is 10/10 and located on the temples and front aspect of her head that prompted an ED visit. Has had nausea from the infection. Has had photophobia and diarrhea. Notes she is able to eat and drink. Her headache improves with sitting up. Per triage note she has continued left flank pain. Has a fever of 103.2 in the ED. Denies any vomiting, runny nose, congestion, or any other complaints at this time.       REVIEW OF SYSTEMS   Review of Systems   Constitutional: Positive for fever.   HENT: Negative for congestion and rhinorrhea.    Eyes: Positive for photophobia.   Gastrointestinal: Positive for diarrhea and nausea. Negative for vomiting.   Genitourinary: Positive for flank pain.   Neurological: Positive for headaches.      All other systems reviewed and negative    PAST MEDICAL HISTORY:  History reviewed. No pertinent past medical history.    PAST SURGICAL HISTORY:  Past Surgical History:   Procedure Laterality Date     NO PAST SURGERIES         CURRENT MEDICATIONS:    No current facility-administered medications for this encounter.     Current Outpatient Medications   Medication     cefpodoxime (VANTIN) 200 MG tablet       ALLERGIES:  No Known Allergies    FAMILY HISTORY:  Family History   Problem Relation Age of Onset     No Known Problems Mother      No Known Problems Father      Hypertension Paternal Grandmother  "     Cancer No family hx of      Diabetes No family hx of      Coronary Artery Disease No family hx of      Heart Disease No family hx of      Thyroid Disease No family hx of      Depression No family hx of      Bleeding Diathesis No family hx of      Asthma No family hx of        SOCIAL HISTORY:   Social History     Socioeconomic History     Marital status: Single   Tobacco Use     Smoking status: Never Smoker     Smokeless tobacco: Never Used   Vaping Use     Vaping Use: Never used   Substance and Sexual Activity     Alcohol use: Yes     Drug use: Never     Sexual activity: Yes     Partners: Male     Birth control/protection: None   Social History Narrative    ** Merged History Encounter **         Lives with parents and 4 siblings.  Student at [employer]--[employer], [employer], economics minor       VITALS:  /60   Pulse 87   Temp (!) 103.2  F (39.6  C) (Oral)   Resp 16   Ht 1.6 m (5' 3\")   Wt 59 kg (130 lb)   LMP 07/18/2022 (Exact Date)   SpO2 98%   BMI 23.03 kg/m      PHYSICAL EXAM:  Physical Exam  Vitals and nursing note reviewed.   Constitutional:       Appearance: Normal appearance.   HENT:      Head: Normocephalic and atraumatic.      Right Ear: External ear normal.      Left Ear: External ear normal.      Nose: Nose normal.      Mouth/Throat:      Mouth: Mucous membranes are moist.   Eyes:      Extraocular Movements: Extraocular movements intact.      Conjunctiva/sclera: Conjunctivae normal.      Pupils: Pupils are equal, round, and reactive to light.   Cardiovascular:      Rate and Rhythm: Regular rhythm. Tachycardia present.   Pulmonary:      Effort: Pulmonary effort is normal.      Breath sounds: Normal breath sounds. No wheezing or rales.   Abdominal:      General: Abdomen is flat. There is no distension.      Palpations: Abdomen is soft.      Tenderness: There is no abdominal tenderness. There is no guarding.   Musculoskeletal:         General: Normal range of motion.      " Cervical back: Normal range of motion and neck supple. No rigidity.      Right lower leg: No edema.      Left lower leg: No edema.   Lymphadenopathy:      Cervical: No cervical adenopathy.   Skin:     General: Skin is warm and dry.   Neurological:      General: No focal deficit present.      Mental Status: She is alert and oriented to person, place, and time. Mental status is at baseline.      Comments: No gross focal neurologic deficits   Psychiatric:         Mood and Affect: Mood normal.         Behavior: Behavior normal.         Thought Content: Thought content normal.          LAB:  All pertinent labs reviewed and interpreted.  Results for orders placed or performed during the hospital encounter of 07/26/22   Basic metabolic panel   Result Value Ref Range    Sodium 134 (L) 136 - 145 mmol/L    Potassium 3.4 (L) 3.5 - 5.0 mmol/L    Chloride 104 98 - 107 mmol/L    Carbon Dioxide (CO2) 20 (L) 22 - 31 mmol/L    Anion Gap 10 5 - 18 mmol/L    Urea Nitrogen 4 (L) 8 - 22 mg/dL    Creatinine 0.66 0.60 - 1.10 mg/dL    Calcium 8.8 8.5 - 10.5 mg/dL    Glucose 117 70 - 125 mg/dL    GFR Estimate >90 >60 mL/min/1.73m2   Lactic acid whole blood   Result Value Ref Range    Lactic Acid 0.7 0.7 - 2.0 mmol/L   Result Value Ref Range    Magnesium 1.6 (L) 1.8 - 2.6 mg/dL   Result Value Ref Range    Procalcitonin 1.05 (H) 0.00 - 0.49 ng/mL   Symptomatic; Unknown Influenza A/B & SARS-CoV2 (COVID-19) Virus PCR Multiplex Nasopharyngeal    Specimen: Nasopharyngeal; Swab   Result Value Ref Range    Influenza A PCR Negative Negative    Influenza B PCR Negative Negative    SARS CoV2 PCR Negative Negative   CBC with platelets and differential   Result Value Ref Range    WBC Count 10.0 4.0 - 11.0 10e3/uL    RBC Count 4.98 3.80 - 5.20 10e6/uL    Hemoglobin 12.9 11.7 - 15.7 g/dL    Hematocrit 39.5 35.0 - 47.0 %    MCV 79 78 - 100 fL    MCH 25.9 (L) 26.5 - 33.0 pg    MCHC 32.7 31.5 - 36.5 g/dL    RDW 14.3 10.0 - 15.0 %    Platelet Count 220 150 -  450 10e3/uL    % Neutrophils 83 %    % Lymphocytes 7 %    % Monocytes 10 %    % Eosinophils 0 %    % Basophils 0 %    % Immature Granulocytes 0 %    NRBCs per 100 WBC 0 <1 /100    Absolute Neutrophils 8.2 1.6 - 8.3 10e3/uL    Absolute Lymphocytes 0.7 (L) 0.8 - 5.3 10e3/uL    Absolute Monocytes 1.0 0.0 - 1.3 10e3/uL    Absolute Eosinophils 0.0 0.0 - 0.7 10e3/uL    Absolute Basophils 0.0 0.0 - 0.2 10e3/uL    Absolute Immature Granulocytes 0.0 <=0.4 10e3/uL    Absolute NRBCs 0.0 10e3/uL       RADIOLOGY:  Reviewed all pertinent imaging. Please see official radiology report.  US Renal Complete    (Results Pending)       I, Patric oFx, am serving as a scribe to document services personally performed by Dr. Peters based on my observation and the provider's statements to me. I, Jesus Peters MD attest that Patric Fox is acting in a scribe capacity, has observed my performance of the services and has documented them in accordance with my direction.    Jesus Peters M.D.  Emergency Medicine  [facility] EMERGENCY DEPARTMENT  [address] [id number] [phone]  Dept: [phone]     Jesus Peters MD  07/26/22 0521

## 2022-07-26 NOTE — Clinical Note
Ruel Salter was seen and treated in our emergency department on 7/26/2022.  She may return to work on 07/28/2022.       If you have any questions or concerns, please don't hesitate to call.      Jesus Peters MD

## 2022-07-26 NOTE — DISCHARGE INSTRUCTIONS
Make sure you are drinking plenty of fluids.    Take Tylenol and ibuprofen for fever and headache    Continue your antibiotics

## 2022-07-26 NOTE — PROGRESS NOTES
Clinic Care Coordination Contact    Follow Up Progress Note      Assessment: The pt was recently in the ED, I called to check up on the pt and help the pt setup a ED follow up. The pt was at [facility] for headache and flank pain. I called and talked to the pt, pt stated that she is doing ok. Pt stated that she did not need a follow up. I told her that if she changes her mind, to give the clinic a call.    Care Gaps:    Health Maintenance Due   Topic Date Due     ADVANCE CARE PLANNING  Never done     HIV SCREENING  Never done     HEPATITIS C SCREENING  Never done     COVID-19 Vaccine (3 - Booster for Pfizer series) 12/14/2021     PHQ-2 (once per calendar year)  01/01/2022           Goals addressed this encounter:    Goals Addressed    None         Intervention/Education provided during outreach:               Plan:   2Care Coordinator will follow up in month

## 2022-07-31 LAB
BACTERIA BLD CULT: NO GROWTH
BACTERIA BLD CULT: NO GROWTH

## 2022-08-31 ENCOUNTER — OFFICE VISIT (OUTPATIENT)
Dept: FAMILY MEDICINE | Facility: CLINIC | Age: 22
End: 2022-08-31
Payer: COMMERCIAL

## 2022-08-31 VITALS
WEIGHT: 129 LBS | TEMPERATURE: 98.8 F | DIASTOLIC BLOOD PRESSURE: 78 MMHG | SYSTOLIC BLOOD PRESSURE: 97 MMHG | HEIGHT: 63 IN | BODY MASS INDEX: 22.86 KG/M2 | RESPIRATION RATE: 16 BRPM | HEART RATE: 60 BPM

## 2022-08-31 DIAGNOSIS — N10 ACUTE PYELONEPHRITIS: Primary | ICD-10-CM

## 2022-08-31 PROCEDURE — 99213 OFFICE O/P EST LOW 20 MIN: CPT | Performed by: FAMILY MEDICINE

## 2022-08-31 NOTE — PROGRESS NOTES
Assessment & Plan     Acute pyelonephritis  Improved after 2 weeks of oral antibiotic treatment, prevention discussed with good hydration, cranberry juice etc. recheck urine analysis after her menstrual periods,  plan to get a kidney ultrasound to ensure improvement of the left kidney 2.9 cm circumscribed hyperechoic area, seen ultrasound July 26, 2022.  - REVIEW OF HEALTH MAINTENANCE PROTOCOL ORDERS  - UA reflex to Microscopic and Culture; Future    Review of external notes as documented elsewhere in note  25 minutes spent on the date of the encounter doing chart review, review of outside records, review of test results, interpretation of tests, patient visit and documentation            No follow-ups on file.    Yudith Delvalle MD  [facility]    Danielle Lipscomb is a 22 year old accompanied by her self, presenting for the following health issues:  Hospital F/U      HPI   She is following up on recent ER visit x2 for acute pyelonephritis, urine culture did grow E. coli sensitive to Vantin that she took for 2 weeks.  Overall she is doing fine she is afebrile flank pain improved.  We did review CT scan and ultrasound report.    Hospital Follow-up Visit:    Hospital/Nursing Home/IP Rehab Facility: [facility]  Date of Admission: 7/24/22  Date of Discharge: 7/26/22  Reason(s) for Admission: kidney infection    Was your hospitalization related to COVID-19? No   Problems taking medications regularly:  None  Medication changes since discharge: None  Problems adhering to non-medication therapy:  None    Summary of hospitalization:  [facility] discharge summary reviewed  Diagnostic Tests/Treatments reviewed.  Follow up needed: yes  Other Healthcare Providers Involved in Patient s Care:         None  Update since discharge: improved.         Post Medication Reconciliation Status:        Plan of care communicated with patient         Review of Systems  "  Constitutional, HEENT, cardiovascular, pulmonary, gi and gu systems are negative, except as otherwise noted.      Objective    BP 97/78 (BP Location: Left arm, Patient Position: Sitting, Cuff Size: Adult Regular)   Pulse 60   Temp 98.8  F (37.1  C) (Oral)   Resp 16   Ht 1.6 m (5' 2.99\")   Wt 58.5 kg (129 lb)   LMP 07/27/2022   BMI 22.86 kg/m    Body mass index is 22.86 kg/m .  Physical Exam   GENERAL: healthy, alert and no distress  NECK: no adenopathy, no asymmetry, masses, or scars and thyroid normal to palpation  RESP: lungs clear to auscultation - no rales, rhonchi or wheezes  CV: regular rate and rhythm, normal S1 S2, no S3 or S4, no murmur, click or rub, no peripheral edema and peripheral pulses strong  ABDOMEN: soft, nontender, no hepatosplenomegaly, no masses and bowel sounds normal , no flank pain  MS: no gross musculoskeletal defects noted, no edema                .  ..  "

## 2022-08-31 NOTE — PATIENT INSTRUCTIONS
Call us to schedule a kidney ultrasound in about 8 weeks.  Collect the urine after your menstrual period and bring it at the clinic for analysis.

## 2022-10-22 ENCOUNTER — OFFICE VISIT (OUTPATIENT)
Dept: FAMILY MEDICINE | Facility: CLINIC | Age: 22
End: 2022-10-22
Payer: COMMERCIAL

## 2022-10-22 VITALS
OXYGEN SATURATION: 99 % | TEMPERATURE: 98.6 F | HEIGHT: 63 IN | BODY MASS INDEX: 22.75 KG/M2 | DIASTOLIC BLOOD PRESSURE: 75 MMHG | SYSTOLIC BLOOD PRESSURE: 111 MMHG | RESPIRATION RATE: 16 BRPM | WEIGHT: 128.4 LBS | HEART RATE: 81 BPM

## 2022-10-22 DIAGNOSIS — R10.9 LEFT FLANK PAIN: Primary | ICD-10-CM

## 2022-10-22 LAB
ALBUMIN UR-MCNC: NEGATIVE MG/DL
APPEARANCE UR: CLEAR
BACTERIA #/AREA URNS HPF: ABNORMAL /HPF
BASOPHILS # BLD AUTO: 0 10E3/UL (ref 0–0.2)
BASOPHILS NFR BLD AUTO: 0 %
BILIRUB UR QL STRIP: NEGATIVE
COLOR UR AUTO: YELLOW
EOSINOPHIL # BLD AUTO: 0.1 10E3/UL (ref 0–0.7)
EOSINOPHIL NFR BLD AUTO: 2 %
ERYTHROCYTE [DISTWIDTH] IN BLOOD BY AUTOMATED COUNT: 13.8 % (ref 10–15)
GLUCOSE UR STRIP-MCNC: NEGATIVE MG/DL
HCT VFR BLD AUTO: 43.3 % (ref 35–47)
HGB BLD-MCNC: 14.3 G/DL (ref 11.7–15.7)
HGB UR QL STRIP: NEGATIVE
IMM GRANULOCYTES # BLD: 0 10E3/UL
IMM GRANULOCYTES NFR BLD: 0 %
KETONES UR STRIP-MCNC: NEGATIVE MG/DL
LEUKOCYTE ESTERASE UR QL STRIP: ABNORMAL
LYMPHOCYTES # BLD AUTO: 1.2 10E3/UL (ref 0.8–5.3)
LYMPHOCYTES NFR BLD AUTO: 22 %
MCH RBC QN AUTO: 26.4 PG (ref 26.5–33)
MCHC RBC AUTO-ENTMCNC: 33 G/DL (ref 31.5–36.5)
MCV RBC AUTO: 80 FL (ref 78–100)
MONOCYTES # BLD AUTO: 0.5 10E3/UL (ref 0–1.3)
MONOCYTES NFR BLD AUTO: 10 %
NEUTROPHILS # BLD AUTO: 3.5 10E3/UL (ref 1.6–8.3)
NEUTROPHILS NFR BLD AUTO: 66 %
NITRATE UR QL: NEGATIVE
PH UR STRIP: 6 [PH] (ref 5–8)
PLATELET # BLD AUTO: 314 10E3/UL (ref 150–450)
RBC # BLD AUTO: 5.41 10E6/UL (ref 3.8–5.2)
RBC #/AREA URNS AUTO: ABNORMAL /HPF
SP GR UR STRIP: 1.01 (ref 1–1.03)
SQUAMOUS #/AREA URNS AUTO: ABNORMAL /LPF
UROBILINOGEN UR STRIP-ACNC: 0.2 E.U./DL
WBC # BLD AUTO: 5.4 10E3/UL (ref 4–11)
WBC #/AREA URNS AUTO: ABNORMAL /HPF

## 2022-10-22 PROCEDURE — 99213 OFFICE O/P EST LOW 20 MIN: CPT | Performed by: PHYSICIAN ASSISTANT

## 2022-10-22 PROCEDURE — 81001 URINALYSIS AUTO W/SCOPE: CPT | Performed by: PHYSICIAN ASSISTANT

## 2022-10-22 PROCEDURE — 85025 COMPLETE CBC W/AUTO DIFF WBC: CPT | Performed by: PHYSICIAN ASSISTANT

## 2022-10-22 PROCEDURE — 87086 URINE CULTURE/COLONY COUNT: CPT | Performed by: PHYSICIAN ASSISTANT

## 2022-10-22 PROCEDURE — 36415 COLL VENOUS BLD VENIPUNCTURE: CPT | Performed by: PHYSICIAN ASSISTANT

## 2022-10-22 NOTE — PATIENT INSTRUCTIONS
Continue to monitor your symptoms.  Return to the urgent care if you develop worsening urinary symptoms flank pain or fever.  If it is after hours you may return to the ER.  Follow suggestions in the patient handout below.

## 2022-10-22 NOTE — PROGRESS NOTES
Patient presents with:  Kidney Problem: Kidney infection, pt feels pain , pt had kidney infection in the past      Clinical Decision Making:  Multiple etiologies and diagnoses were considered to include but not limited to nephrolithiasis, UTI, ovarian cyst, diverticulitis or colitis.  Abdomen: Patient presented to clinic ostensibly concerned of a kidney infection.  At this time she does not have a fever, no flank pain and is not having urinary symptoms.  Urinalysis did not show a preponderance of evidence for a kidney stone or urinary tract infection.  Urine is sent for culture.  Patient be contacted if urine culture is indicating treatment for urinary tract infection.  Patient was afebrile and had 2 out of 4 pain that was intermittent.  Currently she is not having flank pain and the pain has been going to the left lower quadrant.  She has no vomiting or diarrhea.  She has been passing her bowels normally.  Patient is treated with tincture of time she will continue to monitor her symptoms.  If symptoms change she will return to the urgent care for reevaluation and treatment.  Questions were answered patient's satisfaction before discharge.      ICD-10-CM    1. Left flank pain  R10.9 UA macro with reflex to Microscopic and Culture - Clinc Collect     CBC with platelets and differential     Urine Microscopic          Patient Instructions   Continue to monitor your symptoms.  Return to the urgent care if you develop worsening urinary symptoms flank pain or fever.  If it is after hours you may return to the ER.  Follow suggestions in the patient handout below.          HPI:  Ruel Salter is a 22 year old female who presents today for 4-day history of left-sided flank pain.  Patient has had a prior history of a kidney infection.  Patient is concerned that she has a kidney infection. Patient has not had irritative voiding symptoms to include urinary hesitancy urgency frequency and dysuria.  Patient denies gross  "hematuria.  Denies fever chills night sweats fatigue or other red flag symptoms to include no vaginal discharge.  She has had a previous urinary tract infection which she is concerned and would like to have evaluated.  No prior history of nephrolithiasis.      Patient continues to eat and drink normally and passing her bowels.  She has had normal soft formed stools.    History obtained from chart review and the patient.    Problem List:  2020-07: Hallux abductovalgus with bunions, unspecified laterality      No past medical history on file.    Social History     Tobacco Use     Smoking status: Never     Smokeless tobacco: Never   Substance Use Topics     Alcohol use: Yes       Review of Systems  As above in HPI otherwise negative.    Vitals:    10/22/22 1103   BP: 111/75   BP Location: Right arm   Patient Position: Sitting   Cuff Size: Adult Regular   Pulse: 81   Resp: 16   Temp: 98.6  F (37  C)   TempSrc: Oral   SpO2: 99%   Weight: 58.2 kg (128 lb 6.4 oz)   Height: 1.6 m (5' 3\")       General: Patient is resting comfortably no acute distress is afebrile  HEENT: Head is normocephalic atraumatic   eyes are PERRL EOMI sclera anicteric   TMs are clear bilaterally  Throat is clear  No cervical lymphadenopathy present  LUNGS: Clear to auscultation bilaterally  HEART: Regular rate and rhythm  Abdomen: Nontender nondistended no rebound no guarding no masses no suprapubic tenderness or induration.  No CVA tenderness to percussion.  Mild slight tenderness to palpation left lower quadrant of the abdomen but no rebound or guarding.  Skin: Without rash non-diaphoretic    Physical Exam      Labs:  Results for orders placed or performed in visit on 10/22/22   UA macro with reflex to Microscopic and Culture - Clinc Collect     Status: Abnormal    Specimen: Urine, Clean Catch   Result Value Ref Range    Color Urine Yellow Colorless, Straw, Light Yellow, Yellow    Appearance Urine Clear Clear    Glucose Urine Negative Negative, 1000 " , >=2000 mg/dL    Bilirubin Urine Negative Negative    Ketones Urine Negative Negative, 160  mg/dL    Specific Gravity Urine 1.010 1.005 - 1.030    Blood Urine Negative Negative    pH Urine 6.0 5.0 - 8.0    Protein Albumin Urine Negative Negative, 300 , >=2000 mg/dL    Urobilinogen Urine 0.2 0.2, 1.0 E.U./dL    Nitrite Urine Negative Negative    Leukocyte Esterase Urine Trace (A) Negative   CBC with platelets and differential     Status: Abnormal   Result Value Ref Range    WBC Count 5.4 4.0 - 11.0 10e3/uL    RBC Count 5.41 (H) 3.80 - 5.20 10e6/uL    Hemoglobin 14.3 11.7 - 15.7 g/dL    Hematocrit 43.3 35.0 - 47.0 %    MCV 80 78 - 100 fL    MCH 26.4 (L) 26.5 - 33.0 pg    MCHC 33.0 31.5 - 36.5 g/dL    RDW 13.8 10.0 - 15.0 %    Platelet Count 314 150 - 450 10e3/uL    % Neutrophils 66 %    % Lymphocytes 22 %    % Monocytes 10 %    % Eosinophils 2 %    % Basophils 0 %    % Immature Granulocytes 0 %    Absolute Neutrophils 3.5 1.6 - 8.3 10e3/uL    Absolute Lymphocytes 1.2 0.8 - 5.3 10e3/uL    Absolute Monocytes 0.5 0.0 - 1.3 10e3/uL    Absolute Eosinophils 0.1 0.0 - 0.7 10e3/uL    Absolute Basophils 0.0 0.0 - 0.2 10e3/uL    Absolute Immature Granulocytes 0.0 <=0.4 10e3/uL   Urine Microscopic     Status: Abnormal   Result Value Ref Range    Bacteria Urine Few (A) None Seen /HPF    RBC Urine 0-2 0-2 /HPF /HPF    WBC Urine 0-5 0-5 /HPF /HPF    Squamous Epithelials Urine Moderate (A) None Seen /LPF    Narrative    Urine Culture not indicated   CBC with platelets and differential     Status: Abnormal    Narrative    The following orders were created for panel order CBC with platelets and differential.  Procedure                               Abnormality         Status                     ---------                               -----------         ------                     CBC with platelets and d...[[id number]]  Abnormal            Final result                 Please view results for these tests on the individual orders.      Urine is sent for culture.    At the end of the encounter, I discussed results, diagnosis, medications. Discussed red flags for immediate return to clinic/ER, as well as indications for follow up if no improvement. Patient understood and agreed to plan. Patient was stable for discharge.

## 2022-10-23 LAB — BACTERIA UR CULT: NO GROWTH

## 2022-12-26 ENCOUNTER — HEALTH MAINTENANCE LETTER (OUTPATIENT)
Age: 22
End: 2022-12-26

## 2023-02-06 ENCOUNTER — TELEPHONE (OUTPATIENT)
Dept: OPHTHALMOLOGY | Age: 23
End: 2023-02-06

## 2023-02-07 ENCOUNTER — OFFICE VISIT (OUTPATIENT)
Dept: OPHTHALMOLOGY | Age: 23
End: 2023-02-07

## 2023-02-07 DIAGNOSIS — H52.13 BILATERAL MYOPIA: Primary | ICD-10-CM

## 2023-02-07 PROCEDURE — 92310 CONTACT LENS FITTING OU: CPT | Performed by: OPTOMETRIST

## 2023-02-07 PROCEDURE — 92015 DETERMINE REFRACTIVE STATE: CPT | Performed by: OPTOMETRIST

## 2023-02-07 PROCEDURE — 92004 COMPRE OPH EXAM NEW PT 1/>: CPT | Performed by: OPTOMETRIST

## 2023-02-07 ASSESSMENT — REFRACTION_MANIFEST
OS_AXIS: 089
METHOD_AUTOREFRACTION: 1
OD_CYLINDER: +1.00
OS_CYLINDER: +1.75
OD_AXIS: 085
OD_SPHERE: -5.75
OS_CYLINDER: +1.50
OD_AXIS: 090
OS_AXIS: 085
OS_SPHERE: -7.00
OS_SPHERE: -7.00
OD_CYLINDER: +1.00
OD_SPHERE: -5.75

## 2023-02-07 ASSESSMENT — REFRACTION_CURRENTRX
OD_SPHERE: -4.00
OD_BRAND: AIR OPTIX HYDRAGLYDE
OS_BASECURVE: 8.6
OS_SPHERE: -5.75
OD_BASECURVE: 8.6
OS_DIAMETER: 14.2
OD_DIAMETER: 14.2
OS_BRAND: AIR OPTIX HYDRAGLYDE

## 2023-02-07 ASSESSMENT — VISUAL ACUITY
CORRECTION_TYPE: CONTACTS
OS_CC: 20/20
OD_CC: 20/20
OD_CC: 20/30
OS_CC: 20/30
METHOD: SNELLEN - LINEAR
OS_CC+: -1

## 2023-02-07 ASSESSMENT — EXTERNAL EXAM - LEFT EYE: OS_EXAM: NORMAL

## 2023-02-07 ASSESSMENT — REFRACTION_WEARINGRX
OS_SPHERE: -5.00
OS_CYLINDER: SPHERE
SPECS_TYPE: SINGLE VISION
OD_CYLINDER: SPHERE
OD_SPHERE: -5.00

## 2023-02-07 ASSESSMENT — CONF VISUAL FIELD
OS_INFERIOR_TEMPORAL_RESTRICTION: 0
OS_NORMAL: 1
OD_INFERIOR_TEMPORAL_RESTRICTION: 0
OS_SUPERIOR_NASAL_RESTRICTION: 0
OS_INFERIOR_NASAL_RESTRICTION: 0
OD_INFERIOR_NASAL_RESTRICTION: 0
OD_NORMAL: 1
OD_SUPERIOR_TEMPORAL_RESTRICTION: 0
OS_SUPERIOR_TEMPORAL_RESTRICTION: 0
OD_SUPERIOR_NASAL_RESTRICTION: 0
METHOD: COUNTING FINGERS

## 2023-02-07 ASSESSMENT — EXTERNAL EXAM - RIGHT EYE: OD_EXAM: NORMAL

## 2023-02-07 ASSESSMENT — KERATOMETRY
OS_AXISANGLE2_DEGREES: 180
OD_AXISANGLE2_DEGREES: 175
OS_K1POWER_DIOPTERS: 41.75
OS_K2POWER_DIOPTERS: 43.75
OD_AXISANGLE_DEGREES: 085
OD_K1POWER_DIOPTERS: 42.00
OD_K2POWER_DIOPTERS: 43.50
METHOD_AUTO_MANUAL: AUTOMATED
OS_AXISANGLE_DEGREES: 090

## 2023-02-07 ASSESSMENT — TONOMETRY
IOP_METHOD: APPLANATION
OS_IOP_MMHG: 19
OD_IOP_MMHG: 19

## 2023-02-07 ASSESSMENT — CUP TO DISC RATIO
OD_RATIO: 0.4
OS_RATIO: 0.4

## 2023-02-07 ASSESSMENT — SLIT LAMP EXAM - LIDS
COMMENTS: NORMAL
COMMENTS: NORMAL

## 2023-02-07 ASSESSMENT — GONIOSCOPY
OD_NASAL: 3
OS_NASAL: 3

## 2023-03-01 ENCOUNTER — OFFICE VISIT (OUTPATIENT)
Dept: FAMILY MEDICINE | Age: 23
End: 2023-03-01

## 2023-03-01 VITALS
SYSTOLIC BLOOD PRESSURE: 100 MMHG | BODY MASS INDEX: 22.91 KG/M2 | HEIGHT: 64 IN | WEIGHT: 134.2 LBS | DIASTOLIC BLOOD PRESSURE: 70 MMHG | HEART RATE: 73 BPM

## 2023-03-01 DIAGNOSIS — Z76.89 ENCOUNTER TO ESTABLISH CARE WITH NEW DOCTOR: ICD-10-CM

## 2023-03-01 DIAGNOSIS — Z23 NEED FOR VACCINATION: ICD-10-CM

## 2023-03-01 DIAGNOSIS — Z12.4 SCREENING FOR CERVICAL CANCER: ICD-10-CM

## 2023-03-01 DIAGNOSIS — Z30.011 ENCOUNTER FOR BCP (BIRTH CONTROL PILLS) INITIAL PRESCRIPTION: ICD-10-CM

## 2023-03-01 DIAGNOSIS — Z01.419 WELL FEMALE EXAM WITH ROUTINE GYNECOLOGICAL EXAM: Primary | ICD-10-CM

## 2023-03-01 PROCEDURE — 88175 CYTOPATH C/V AUTO FLUID REDO: CPT | Performed by: INTERNAL MEDICINE

## 2023-03-01 PROCEDURE — 99395 PREV VISIT EST AGE 18-39: CPT | Performed by: FAMILY MEDICINE

## 2023-03-01 PROCEDURE — 90686 IIV4 VACC NO PRSV 0.5 ML IM: CPT | Performed by: FAMILY MEDICINE

## 2023-03-01 PROCEDURE — 90471 IMMUNIZATION ADMIN: CPT | Performed by: FAMILY MEDICINE

## 2023-03-01 RX ORDER — NORGESTIMATE AND ETHINYL ESTRADIOL 0.25-0.035
1 KIT ORAL DAILY
Qty: 84 TABLET | Refills: 4 | Status: SHIPPED | OUTPATIENT
Start: 2023-03-01 | End: 2023-11-02

## 2023-03-01 ASSESSMENT — PATIENT HEALTH QUESTIONNAIRE - PHQ9
CLINICAL INTERPRETATION OF PHQ2 SCORE: NO FURTHER SCREENING NEEDED
2. FEELING DOWN, DEPRESSED OR HOPELESS: NOT AT ALL
SUM OF ALL RESPONSES TO PHQ9 QUESTIONS 1 AND 2: 0
SUM OF ALL RESPONSES TO PHQ9 QUESTIONS 1 AND 2: 0
1. LITTLE INTEREST OR PLEASURE IN DOING THINGS: NOT AT ALL

## 2023-03-06 LAB — HOLD SPECIMEN: NORMAL

## 2023-03-09 LAB
CASE RPRT: NORMAL
CLINICAL INFO: NORMAL
CYTOLOGY CVX/VAG STUDY: NORMAL
PAP EDUCATIONAL NOTE: NORMAL
SPECIMEN ADEQUACY: NORMAL

## 2023-04-16 ENCOUNTER — HEALTH MAINTENANCE LETTER (OUTPATIENT)
Age: 23
End: 2023-04-16

## 2023-06-01 ENCOUNTER — WALK IN (OUTPATIENT)
Dept: URGENT CARE | Age: 23
End: 2023-06-01

## 2023-06-01 ENCOUNTER — LAB SERVICES (OUTPATIENT)
Dept: LAB | Age: 23
End: 2023-06-01

## 2023-06-01 ENCOUNTER — APPOINTMENT (OUTPATIENT)
Dept: LAB | Age: 23
End: 2023-06-01

## 2023-06-01 VITALS
HEART RATE: 115 BPM | WEIGHT: 133 LBS | OXYGEN SATURATION: 98 % | RESPIRATION RATE: 12 BRPM | BODY MASS INDEX: 23.13 KG/M2 | SYSTOLIC BLOOD PRESSURE: 102 MMHG | DIASTOLIC BLOOD PRESSURE: 72 MMHG | TEMPERATURE: 99.7 F

## 2023-06-01 DIAGNOSIS — R19.7 DIARRHEA, UNSPECIFIED TYPE: Primary | ICD-10-CM

## 2023-06-01 DIAGNOSIS — R19.7 DIARRHEA, UNSPECIFIED TYPE: ICD-10-CM

## 2023-06-01 PROCEDURE — 87077 CULTURE AEROBIC IDENTIFY: CPT | Performed by: CLINICAL MEDICAL LABORATORY

## 2023-06-01 PROCEDURE — 87046 STOOL CULTR AEROBIC BACT EA: CPT | Performed by: INTERNAL MEDICINE

## 2023-06-01 PROCEDURE — 87045 FECES CULTURE AEROBIC BACT: CPT | Performed by: CLINICAL MEDICAL LABORATORY

## 2023-06-01 PROCEDURE — 87505 NFCT AGENT DETECTION GI: CPT | Performed by: CLINICAL MEDICAL LABORATORY

## 2023-06-01 PROCEDURE — 87493 C DIFF AMPLIFIED PROBE: CPT | Performed by: CLINICAL MEDICAL LABORATORY

## 2023-06-01 PROCEDURE — 99204 OFFICE O/P NEW MOD 45 MIN: CPT | Performed by: FAMILY MEDICINE

## 2023-06-01 PROCEDURE — 87427 SHIGA-LIKE TOXIN AG IA: CPT | Performed by: CLINICAL MEDICAL LABORATORY

## 2023-06-01 PROCEDURE — 87449 NOS EACH ORGANISM AG IA: CPT | Performed by: CLINICAL MEDICAL LABORATORY

## 2023-06-02 LAB — C DIFF TOX B TCDB STL QL NAA+PROBE: NOT DETECTED

## 2023-06-04 ENCOUNTER — TELEPHONE (OUTPATIENT)
Dept: URGENT CARE | Age: 23
End: 2023-06-04

## 2023-06-04 DIAGNOSIS — A05.3: Primary | ICD-10-CM

## 2023-06-04 RX ORDER — DOXYCYCLINE HYCLATE 100 MG
100 TABLET ORAL 2 TIMES DAILY
Qty: 14 TABLET | Refills: 0 | Status: SHIPPED | OUTPATIENT
Start: 2023-06-04 | End: 2023-06-12

## 2023-06-05 LAB
C JEJUNI+C COLI AG STL QL: NORMAL
C PARVUM+HOMINIS COWP STL QL NAA+PROBE: NOT DETECTED
E COLI SHIGA-LIKE TOXIN 1+2 STL QL IA: NORMAL
E HISTOLYTICA 18S RRNA STL QL NAA+PROBE: NOT DETECTED
G LAMBLIA 18S RRNA STL QL NAA+PROBE: NOT DETECTED
SERVICE CMNT-IMP: NORMAL

## 2023-06-07 LAB
BACTERIA STL CULT: ABNORMAL
VIBRIO STL CULT: ABNORMAL

## 2023-07-11 ENCOUNTER — E-ADVICE (OUTPATIENT)
Dept: OPHTHALMOLOGY | Age: 23
End: 2023-07-11

## 2023-10-26 ENCOUNTER — V-VISIT (OUTPATIENT)
Dept: FAMILY MEDICINE | Age: 23
End: 2023-10-26

## 2023-10-26 ENCOUNTER — NURSE TRIAGE (OUTPATIENT)
Dept: TELEHEALTH | Age: 23
End: 2023-10-26

## 2023-10-26 DIAGNOSIS — R69 DIAGNOSIS DEFERRED: Primary | ICD-10-CM

## 2023-11-02 ENCOUNTER — OFFICE VISIT (OUTPATIENT)
Dept: FAMILY MEDICINE | Age: 23
End: 2023-11-02

## 2023-11-02 VITALS
HEART RATE: 73 BPM | WEIGHT: 136.4 LBS | DIASTOLIC BLOOD PRESSURE: 64 MMHG | SYSTOLIC BLOOD PRESSURE: 100 MMHG | BODY MASS INDEX: 23.72 KG/M2

## 2023-11-02 DIAGNOSIS — N92.6 IRREGULAR MENSTRUAL BLEEDING: Primary | ICD-10-CM

## 2023-11-02 LAB — B-HCG UR QL: NEGATIVE

## 2023-11-02 PROCEDURE — 99214 OFFICE O/P EST MOD 30 MIN: CPT | Performed by: FAMILY MEDICINE

## 2023-11-02 PROCEDURE — 81025 URINE PREGNANCY TEST: CPT | Performed by: INTERNAL MEDICINE

## 2023-11-03 ENCOUNTER — E-ADVICE (OUTPATIENT)
Dept: FAMILY MEDICINE | Age: 23
End: 2023-11-03

## 2024-03-11 ENCOUNTER — APPOINTMENT (OUTPATIENT)
Dept: FAMILY MEDICINE | Age: 24
End: 2024-03-11

## 2024-03-11 VITALS
BODY MASS INDEX: 24.13 KG/M2 | OXYGEN SATURATION: 98 % | HEIGHT: 63 IN | WEIGHT: 136.2 LBS | HEART RATE: 78 BPM | SYSTOLIC BLOOD PRESSURE: 100 MMHG | DIASTOLIC BLOOD PRESSURE: 70 MMHG

## 2024-03-11 DIAGNOSIS — Z13.220 LIPID SCREENING: ICD-10-CM

## 2024-03-11 DIAGNOSIS — Z00.00 ANNUAL PHYSICAL EXAM: Primary | ICD-10-CM

## 2024-03-11 DIAGNOSIS — Z13.1 SCREENING FOR DIABETES MELLITUS: ICD-10-CM

## 2024-03-11 PROCEDURE — 99395 PREV VISIT EST AGE 18-39: CPT | Performed by: FAMILY MEDICINE

## 2024-03-11 ASSESSMENT — PATIENT HEALTH QUESTIONNAIRE - PHQ9
CLINICAL INTERPRETATION OF PHQ2 SCORE: NO FURTHER SCREENING NEEDED
SUM OF ALL RESPONSES TO PHQ9 QUESTIONS 1 AND 2: 0
2. FEELING DOWN, DEPRESSED OR HOPELESS: NOT AT ALL
SUM OF ALL RESPONSES TO PHQ9 QUESTIONS 1 AND 2: 0
1. LITTLE INTEREST OR PLEASURE IN DOING THINGS: NOT AT ALL

## 2024-05-08 ENCOUNTER — TELEPHONE (OUTPATIENT)
Dept: OPHTHALMOLOGY | Age: 24
End: 2024-05-08

## 2024-07-18 ENCOUNTER — TELEPHONE (OUTPATIENT)
Dept: FAMILY MEDICINE | Age: 24
End: 2024-07-18

## 2024-07-18 DIAGNOSIS — Z20.1 EXPOSURE TO TB: Primary | ICD-10-CM

## 2024-07-19 ENCOUNTER — LAB SERVICES (OUTPATIENT)
Dept: LAB | Age: 24
End: 2024-07-19

## 2024-07-19 DIAGNOSIS — Z20.1 EXPOSURE TO TB: ICD-10-CM

## 2024-07-19 PROCEDURE — 86480 TB TEST CELL IMMUN MEASURE: CPT | Performed by: CLINICAL MEDICAL LABORATORY

## 2024-07-19 PROCEDURE — 36415 COLL VENOUS BLD VENIPUNCTURE: CPT | Performed by: INTERNAL MEDICINE

## 2024-07-20 LAB
GAMMA INTERFERON BACKGROUND BLD IA-ACNC: 0.07 IU/ML
M TB IFN-G BLD-IMP: NEGATIVE
M TB IFN-G CD4+ BCKGRND COR BLD-ACNC: 0.03 IU/ML
M TB IFN-G CD4+CD8+ BCKGRND COR BLD-ACNC: 0.04 IU/ML
MITOGEN IGNF BCKGRD COR BLD-ACNC: 4.77 IU/ML

## 2024-07-22 ENCOUNTER — APPOINTMENT (OUTPATIENT)
Dept: OPHTHALMOLOGY | Age: 24
End: 2024-07-22

## 2024-07-22 DIAGNOSIS — H52.13 BILATERAL MYOPIA: Primary | ICD-10-CM

## 2024-07-22 PROCEDURE — 92014 COMPRE OPH EXAM EST PT 1/>: CPT | Performed by: OPTOMETRIST

## 2024-07-22 PROCEDURE — 92310 CONTACT LENS FITTING OU: CPT | Performed by: OPTOMETRIST

## 2024-07-22 PROCEDURE — 92015 DETERMINE REFRACTIVE STATE: CPT | Performed by: OPTOMETRIST

## 2024-07-22 ASSESSMENT — REFRACTION_MANIFEST
OS_AXIS: 090
OS_CYLINDER: +1.25
OD_SPHERE: -6.00
OD_AXIS: 092
OS_SPHERE: -7.00
OD_CYLINDER: +1.00

## 2024-07-22 ASSESSMENT — CONF VISUAL FIELD
OD_SUPERIOR_TEMPORAL_RESTRICTION: 0
OD_INFERIOR_TEMPORAL_RESTRICTION: 0
OD_NORMAL: 1
OD_SUPERIOR_NASAL_RESTRICTION: 0
METHOD: COUNTING FINGERS
OS_SUPERIOR_TEMPORAL_RESTRICTION: 0
OS_INFERIOR_NASAL_RESTRICTION: 0
OS_SUPERIOR_NASAL_RESTRICTION: 0
OD_INFERIOR_NASAL_RESTRICTION: 0
OS_NORMAL: 1
OS_INFERIOR_TEMPORAL_RESTRICTION: 0

## 2024-07-22 ASSESSMENT — REFRACTION_CURRENTRX
OS_SPHERE: -5.25
OD_AXIS: 180
OS_BASECURVE: 8.7
OD_BASECURVE: 8.7
OD_SPHERE: -4.50
OD_CYLINDER: -0.75
OD_DIAMETER: 14.5
OD_BRAND: AIR OPTIX HYDRAGLYDE FOR ASTIGMATISM
OS_CYLINDER: -1.25
OS_BRAND: AIR OPTIX HYDRAGLYDE FOR ASTIGMATISM
OS_AXIS: 180
OS_DIAMETER: 14.5

## 2024-07-22 ASSESSMENT — REFRACTION_WEARINGRX
OD_SPHERE: -5.75
OS_AXIS: 089
SPECS_TYPE: LAST FINAL AND WEARING
OS_CYLINDER: +1.50
OD_CYLINDER: +1.00
OS_SPHERE: -7.00
OD_AXIS: 090

## 2024-07-22 ASSESSMENT — VISUAL ACUITY
METHOD: SNELLEN - LINEAR
OD_CC: 20/20
OS_CC+: -1
CORRECTION_TYPE: GLASSES
OD_CC+: -1
OS_CC: 20/20

## 2024-07-22 ASSESSMENT — GONIOSCOPY
OS_NASAL: OPEN
OD_NASAL: OPEN

## 2024-07-22 ASSESSMENT — CUP TO DISC RATIO
OS_RATIO: 0.4
OD_RATIO: 0.4

## 2024-07-22 ASSESSMENT — EXTERNAL EXAM - RIGHT EYE: OD_EXAM: NORMAL

## 2024-07-22 ASSESSMENT — SLIT LAMP EXAM - LIDS
COMMENTS: NORMAL
COMMENTS: NORMAL

## 2024-07-22 ASSESSMENT — EXTERNAL EXAM - LEFT EYE: OS_EXAM: NORMAL

## 2024-07-22 ASSESSMENT — TONOMETRY
OS_IOP_MMHG: 20
OD_IOP_MMHG: 20
IOP_METHOD: APPLANATION

## 2025-04-19 ENCOUNTER — HEALTH MAINTENANCE LETTER (OUTPATIENT)
Age: 25
End: 2025-04-19

## 2025-07-22 NOTE — PROGRESS NOTES
Clinic Care Coordination Contact    Follow Up Progress Note      Assessment: The pt was recently in the ED, I called to check up on the pt and help the pt setup a ED follow up. The pt was at [facility] for a fever. I called and talked to the pt, pt stated that she is doing better now. She did not feel that she needs a follow up.     Care Gaps:    Health Maintenance Due   Topic Date Due     ADVANCE CARE PLANNING  Never done     HIV SCREENING  Never done     HEPATITIS C SCREENING  Never done     COVID-19 Vaccine (3 - Booster for Pfizer series) 12/14/2021     PHQ-2 (once per calendar year)  01/01/2022           Goals addressed this encounter:    Goals Addressed    None         Intervention/Education provided during outreach:               Plan:     Care Coordinator will follow up in month  
22-Jul-2025 13:08